# Patient Record
Sex: MALE | Race: WHITE | NOT HISPANIC OR LATINO | Employment: OTHER | URBAN - METROPOLITAN AREA
[De-identification: names, ages, dates, MRNs, and addresses within clinical notes are randomized per-mention and may not be internally consistent; named-entity substitution may affect disease eponyms.]

---

## 2024-04-03 ENCOUNTER — OFFICE VISIT (OUTPATIENT)
Dept: PRIMARY CARE | Facility: CLINIC | Age: 79
End: 2024-04-03
Payer: MEDICARE

## 2024-04-03 VITALS
SYSTOLIC BLOOD PRESSURE: 130 MMHG | TEMPERATURE: 97.8 F | HEIGHT: 69 IN | OXYGEN SATURATION: 98 % | HEART RATE: 65 BPM | WEIGHT: 169.8 LBS | BODY MASS INDEX: 25.15 KG/M2 | DIASTOLIC BLOOD PRESSURE: 68 MMHG

## 2024-04-03 DIAGNOSIS — F17.200 SMOKING: ICD-10-CM

## 2024-04-03 DIAGNOSIS — R79.9 ABNORMAL FINDING OF BLOOD CHEMISTRY, UNSPECIFIED: ICD-10-CM

## 2024-04-03 DIAGNOSIS — E88.819 INSULIN RESISTANCE: Primary | ICD-10-CM

## 2024-04-03 DIAGNOSIS — E78.5 HYPERLIPIDEMIA, UNSPECIFIED HYPERLIPIDEMIA TYPE: ICD-10-CM

## 2024-04-03 DIAGNOSIS — C61 PROSTATE CANCER (MULTI): ICD-10-CM

## 2024-04-03 DIAGNOSIS — Z13.6 ENCOUNTER FOR ABDOMINAL AORTIC ANEURYSM SCREENING: ICD-10-CM

## 2024-04-03 DIAGNOSIS — J44.9 CHRONIC OBSTRUCTIVE PULMONARY DISEASE, UNSPECIFIED COPD TYPE (MULTI): ICD-10-CM

## 2024-04-03 PROCEDURE — 90677 PCV20 VACCINE IM: CPT | Performed by: INTERNAL MEDICINE

## 2024-04-03 PROCEDURE — 1160F RVW MEDS BY RX/DR IN RCRD: CPT | Performed by: INTERNAL MEDICINE

## 2024-04-03 PROCEDURE — 1159F MED LIST DOCD IN RCRD: CPT | Performed by: INTERNAL MEDICINE

## 2024-04-03 PROCEDURE — G0009 ADMIN PNEUMOCOCCAL VACCINE: HCPCS | Performed by: INTERNAL MEDICINE

## 2024-04-03 PROCEDURE — 99205 OFFICE O/P NEW HI 60 MIN: CPT | Performed by: INTERNAL MEDICINE

## 2024-04-03 RX ORDER — ROSUVASTATIN CALCIUM 10 MG/1
10 TABLET, COATED ORAL NIGHTLY
COMMUNITY

## 2024-04-03 RX ORDER — FLUTICASONE FUROATE 100 UG/1
1 POWDER RESPIRATORY (INHALATION) DAILY
COMMUNITY

## 2024-04-03 RX ORDER — MULTIVITAMIN
1 TABLET ORAL DAILY
COMMUNITY

## 2024-04-03 RX ORDER — FLUTICASONE PROPIONATE 50 UG/1
1 POWDER, METERED RESPIRATORY (INHALATION)
COMMUNITY
Start: 2018-11-07 | End: 2024-04-03 | Stop reason: WASHOUT

## 2024-04-03 RX ORDER — ALBUTEROL SULFATE 90 UG/1
1-2 AEROSOL, METERED RESPIRATORY (INHALATION) EVERY 4 HOURS PRN
COMMUNITY
Start: 2022-11-18

## 2024-04-03 RX ORDER — FLUTICASONE PROPIONATE 50 MCG
1 SPRAY, SUSPENSION (ML) NASAL DAILY
COMMUNITY

## 2024-04-03 RX ORDER — OMEPRAZOLE 40 MG/1
40 CAPSULE, DELAYED RELEASE ORAL 2 TIMES DAILY
COMMUNITY
Start: 2024-01-10

## 2024-04-03 ASSESSMENT — ENCOUNTER SYMPTOMS
OCCASIONAL FEELINGS OF UNSTEADINESS: 0
DEPRESSION: 0
LOSS OF SENSATION IN FEET: 0

## 2024-04-03 NOTE — PROGRESS NOTES
"Chief Complaint   Patient presents with    [facility]     Coming from [facility]         History Of Present Illness:    Mateusz XIONG Hastings \"Keegan\" is a 78 y.o. male presenting to [facility] care, update health maintenance and discuss strategies for smoking cessation.  Fabricio switching his care over from [facility] to .  He does not have a history of heart problems but was meeting with a cardiologist on a regular basis.  He is interested in meeting with a cardiologist here at .  He continues to smoke cigarettes and is not sure that he is ready to quit.  He would like to stay healthy to spend more time with his granddaughter and that his motivation.  He took Chantix in the past but it caused nightmares.  He does occasionally use nicotine patch which is effective for curbing cravings.  He stays physically active and reports good exercise tolerance.  He denies chest pain or shortness of breath with exertion.  He completed a stress test on 7/12/2021 negative for ischemia.  He has a history of colon polyps and his last colonoscopy was on 1/10/2024.  5-year follow-up was recommended.    Stress test   7/12/21  CONCLUSIONS:    1. SPECT Perfusion Study: Normal.    2. There is no scintigraphic evidence for inducible ischemia.    3. No evidence of scarred myocardium.    4. Left ventricle is normal in size. The left ventricle systolic   function is normal.    5. Right ventricle is normal in size.    6. This is a low risk scan.       Active Medical Problems:  Patient Active Problem List    Diagnosis Date Noted    COPD (chronic obstructive pulmonary disease) (CMS/HCC) 04/03/2024    Insulin resistance 04/03/2024    Hyperlipidemia 04/03/2024    Smoking 04/03/2024       Past Medical History:  Past Medical History:   Diagnosis Date    Cancer (CMS/HCC) 2000    COPD (chronic obstructive pulmonary disease) (CMS/HCC)     Hyperlipidemia     Insulin resistance     Prostate cancer (CMS/[facility])     Smoking        Past Surgical History:  Past " Surgical History:   Procedure Laterality Date    EYE SURGERY  2000    MR HEAD ANGIO WO IV CONTRAST  05/30/2021    MR HEAD ANGIO WO IV CONTRAST 5/30/2021 [facility]    MR NECK ANGIO WO IV CONTRAST  05/30/2021    MR NECK ANGIO WO IV CONTRAST 5/30/2021 [facility]    NISSEN FUNDOPLICATION      PROSTATE SURGERY  2000         Social History:  Social History     Tobacco Use    Smoking status: Every Day     Packs/day: 0.50     Years: 10.00     Additional pack years: 0.00     Total pack years: 5.00     Types: Cigarettes    Smokeless tobacco: Never   Substance Use Topics    Alcohol use: Not Currently     Comment: AA for 40 years    Drug use: Never         Family History:  Family History   Problem Relation Name Age of Onset    Other (Brain tumor) Mother      Lung cancer Father          complication of surgery    Accidental death Sister Dominga     Stroke Brother Melquiades     Heart failure Daughter      No Known Problems Daughter          Pediatrician        Allergies:  Patient has no known allergies.    Outpatient Medications:    Current Outpatient Medications:     albuterol 90 mcg/actuation inhaler, Inhale 1-2 puffs every 4 hours if needed for shortness of breath., Disp: , Rfl:     cyanocobalamin (Vitamin B-12) 50 mcg tablet, Take 1 tablet (50 mcg) by mouth once daily., Disp: , Rfl:     fluticasone furoate (Arnuity Ellipta) 100 mcg/actuation inhaler, Inhale 1 puff once daily. Rinse mouth with water after use to reduce aftertaste and incidence of candidiasis. Do not swallow., Disp: , Rfl:     multivitamin tablet, Take 1 tablet by mouth once daily., Disp: , Rfl:     omeprazole (PriLOSEC) 40 mg DR capsule, Take 1 capsule (40 mg) by mouth twice a day., Disp: , Rfl:     rosuvastatin (Crestor) 10 mg tablet, Take 1 tablet (10 mg) by mouth once daily at bedtime., Disp: , Rfl:     fluticasone (Flonase) 50 mcg/actuation nasal spray, Administer 1 spray into each nostril once daily. Shake gently. Before first use, prime  "pump. After use, clean tip and replace cap., Disp: , Rfl:     Review of Systems:  Pertinent positives in review of systems outlined above.  Complete ROS otherwise negative.          Last Recorded Vitals:  Vitals:    04/03/24 1412 04/03/24 1507   BP: 146/87 130/68   BP Location: Left arm    Patient Position: Sitting    BP Cuff Size: Large adult    Pulse: 65    Temp: 36.6 °C (97.8 °F)    SpO2: 98%    Weight: 77 kg (169 lb 12.8 oz)    Height: 1.74 m (5' 8.5\")    Body mass index is 25.44 kg/m².        Physical Exam  Vitals reviewed.   Constitutional:       Appearance: Normal appearance.   HENT:      Mouth/Throat:      Pharynx: Oropharynx is clear.   Eyes:      Extraocular Movements: Extraocular movements intact.      Conjunctiva/sclera: Conjunctivae normal.      Pupils: Pupils are equal, round, and reactive to light.   Neck:      Vascular: No carotid bruit.   Cardiovascular:      Rate and Rhythm: Normal rate and regular rhythm.   Pulmonary:      Effort: Pulmonary effort is normal.      Breath sounds: Normal breath sounds.   Abdominal:      General: Abdomen is flat. Bowel sounds are normal.      Palpations: Abdomen is soft. There is no mass.      Tenderness: There is no abdominal tenderness. There is no right CVA tenderness or left CVA tenderness.   Musculoskeletal:      Cervical back: No tenderness.      Right lower leg: No edema.      Left lower leg: No edema.   Lymphadenopathy:      Cervical: No cervical adenopathy.   Neurological:      General: No focal deficit present.      Mental Status: He is alert and oriented to person, place, and time.   Psychiatric:         Mood and Affect: Mood normal.            The ASCVD Risk score (Marco CUELLAR, et al., 2019) failed to calculate for the following reasons:    The valid total cholesterol range is 130 to 320 mg/dL      Assessment/Plan   Problem List Items Addressed This Visit       COPD (chronic obstructive pulmonary disease) (CMS/[facility])     Keegan is using an Arnuity inhaler on a " regular basis.  He is using albuterol as needed.  He has completed low-dose CT scanning for lung cancer screening in the past, his scans were unremarkable.  His last scan was on 12/14/2023.  His pulmonary specialist did not recommend any additional screening.    PCV20 vaccine given at visit     We discussed the importance of smoking cessation, and he is going to begin nicotine patches.         Insulin resistance - Primary     Fasting blood sugar and A1c are due now.  We will discuss diet in detail will be talked to review labs.         Relevant Orders    Comprehensive Metabolic Panel    Hemoglobin A1C    Albumin , Urine Random    Hyperlipidemia     Fasting lipids due now.  We discussed the importance of exercising on a regular basis and eating a plant-based whole food diet to lower cholesterol and lower cardiovascular risk.  We will discuss diet in more detail at next visit.         Relevant Orders    Comprehensive Metabolic Panel    Referral to Cardiology    Smoking     We discussed the health benefits of smoking cessation including lower risk for cardiovascular events, decrease cancer risk and improved energy and physical endurance.  Keegan now believes that he is ready to quit, and he is going to start nicotine patches.    An abdominal ultrasound will be arranged to screen for aortic aneurysm.          Other Visit Diagnoses       Abnormal finding of blood chemistry, unspecified        Relevant Orders    Hemoglobin A1C    Prostate cancer (CMS/HCC)        Relevant Orders    Prostate Specific Antigen    Encounter for abdominal aortic aneurysm screening        Relevant Orders    Vascular US abdominal aorta anuerysm AAA screening            A total of 60 minutes was spent reviewing the chart and recent testing and discussing plan of care.     Fredo Zamora MD

## 2024-04-03 NOTE — PATIENT INSTRUCTIONS
Fasting blood work now     Use nicotine patches     Exercise for 30 min daily     Schedule abdominal ultrasound

## 2024-04-04 NOTE — ASSESSMENT & PLAN NOTE
Fasting lipids due now.  We discussed the importance of exercising on a regular basis and eating a plant-based whole food diet to lower cholesterol and lower cardiovascular risk.  We will discuss diet in more detail at next visit.

## 2024-04-04 NOTE — ASSESSMENT & PLAN NOTE
We discussed the health benefits of smoking cessation including lower risk for cardiovascular events, decrease cancer risk and improved energy and physical endurance.  Keegan now believes that he is ready to quit, and he is going to start nicotine patches.    An abdominal ultrasound will be arranged to screen for aortic aneurysm.

## 2024-04-04 NOTE — ASSESSMENT & PLAN NOTE
Fasting blood sugar and A1c are due now.  We will discuss diet in detail will be talked to review labs.

## 2024-04-04 NOTE — ASSESSMENT & PLAN NOTE
Keegan is using an Arnuity inhaler on a regular basis.  He is using albuterol as needed.  He has completed low-dose CT scanning for lung cancer screening in the past, his scans were unremarkable.  His last scan was on 12/14/2023.  His pulmonary specialist did not recommend any additional screening.    PCV20 vaccine given at visit     We discussed the importance of smoking cessation, and he is going to begin nicotine patches.

## 2024-04-11 ENCOUNTER — HOSPITAL ENCOUNTER (OUTPATIENT)
Dept: VASCULAR MEDICINE | Facility: HOSPITAL | Age: 79
Discharge: HOME | End: 2024-04-11
Payer: MEDICARE

## 2024-04-11 ENCOUNTER — LAB (OUTPATIENT)
Dept: LAB | Facility: LAB | Age: 79
End: 2024-04-11
Payer: MEDICARE

## 2024-04-11 DIAGNOSIS — E78.5 HYPERLIPIDEMIA, UNSPECIFIED HYPERLIPIDEMIA TYPE: ICD-10-CM

## 2024-04-11 DIAGNOSIS — R79.9 ABNORMAL FINDING OF BLOOD CHEMISTRY, UNSPECIFIED: ICD-10-CM

## 2024-04-11 DIAGNOSIS — C61 PROSTATE CANCER (MULTI): ICD-10-CM

## 2024-04-11 DIAGNOSIS — Z13.6 ENCOUNTER FOR ABDOMINAL AORTIC ANEURYSM SCREENING: ICD-10-CM

## 2024-04-11 DIAGNOSIS — E88.819 INSULIN RESISTANCE: ICD-10-CM

## 2024-04-11 LAB
ALBUMIN SERPL BCP-MCNC: 4.1 G/DL (ref 3.4–5)
ALP SERPL-CCNC: 69 U/L (ref 33–136)
ALT SERPL W P-5'-P-CCNC: 11 U/L (ref 10–52)
ANION GAP SERPL CALC-SCNC: 13 MMOL/L (ref 10–20)
AST SERPL W P-5'-P-CCNC: 13 U/L (ref 9–39)
BILIRUB SERPL-MCNC: 0.5 MG/DL (ref 0–1.2)
BUN SERPL-MCNC: 23 MG/DL (ref 6–23)
CALCIUM SERPL-MCNC: 9.4 MG/DL (ref 8.6–10.3)
CHLORIDE SERPL-SCNC: 105 MMOL/L (ref 98–107)
CO2 SERPL-SCNC: 27 MMOL/L (ref 21–32)
CREAT SERPL-MCNC: 1.04 MG/DL (ref 0.5–1.3)
CREAT UR-MCNC: 137.1 MG/DL (ref 20–370)
EGFRCR SERPLBLD CKD-EPI 2021: 74 ML/MIN/1.73M*2
EST. AVERAGE GLUCOSE BLD GHB EST-MCNC: 114 MG/DL
GLUCOSE SERPL-MCNC: 97 MG/DL (ref 74–99)
HBA1C MFR BLD: 5.6 %
MICROALBUMIN UR-MCNC: 8.1 MG/L
MICROALBUMIN/CREAT UR: 5.9 UG/MG CREAT
POTASSIUM SERPL-SCNC: 4.5 MMOL/L (ref 3.5–5.3)
PROT SERPL-MCNC: 6.1 G/DL (ref 6.4–8.2)
PSA SERPL-MCNC: <0.1 NG/ML
SODIUM SERPL-SCNC: 140 MMOL/L (ref 136–145)

## 2024-04-11 PROCEDURE — 83036 HEMOGLOBIN GLYCOSYLATED A1C: CPT

## 2024-04-11 PROCEDURE — 36415 COLL VENOUS BLD VENIPUNCTURE: CPT

## 2024-04-11 PROCEDURE — 82043 UR ALBUMIN QUANTITATIVE: CPT

## 2024-04-11 PROCEDURE — 76706 US ABDL AORTA SCREEN AAA: CPT

## 2024-04-11 PROCEDURE — 82570 ASSAY OF URINE CREATININE: CPT

## 2024-04-11 PROCEDURE — 80053 COMPREHEN METABOLIC PANEL: CPT

## 2024-04-11 PROCEDURE — 76706 US ABDL AORTA SCREEN AAA: CPT | Performed by: SURGERY

## 2024-04-11 PROCEDURE — 84153 ASSAY OF PSA TOTAL: CPT

## 2024-06-11 ENCOUNTER — APPOINTMENT (OUTPATIENT)
Dept: CARDIOLOGY | Facility: HOSPITAL | Age: 79
End: 2024-06-11
Payer: MEDICARE

## 2024-07-17 ENCOUNTER — APPOINTMENT (OUTPATIENT)
Dept: PRIMARY CARE | Facility: CLINIC | Age: 79
End: 2024-07-17
Payer: MEDICARE

## 2024-07-17 VITALS
TEMPERATURE: 98.2 F | WEIGHT: 165.4 LBS | HEART RATE: 67 BPM | SYSTOLIC BLOOD PRESSURE: 124 MMHG | BODY MASS INDEX: 24.78 KG/M2 | OXYGEN SATURATION: 97 % | RESPIRATION RATE: 18 BRPM | DIASTOLIC BLOOD PRESSURE: 64 MMHG

## 2024-07-17 DIAGNOSIS — E88.819 INSULIN RESISTANCE: ICD-10-CM

## 2024-07-17 DIAGNOSIS — G47.00 INSOMNIA, UNSPECIFIED TYPE: Primary | ICD-10-CM

## 2024-07-17 DIAGNOSIS — R79.89 LOW SERUM TOTAL PROTEIN LEVEL: ICD-10-CM

## 2024-07-17 DIAGNOSIS — E78.5 HYPERLIPIDEMIA, UNSPECIFIED HYPERLIPIDEMIA TYPE: ICD-10-CM

## 2024-07-17 PROCEDURE — 1159F MED LIST DOCD IN RCRD: CPT | Performed by: INTERNAL MEDICINE

## 2024-07-17 PROCEDURE — 99214 OFFICE O/P EST MOD 30 MIN: CPT | Performed by: INTERNAL MEDICINE

## 2024-07-17 PROCEDURE — 1160F RVW MEDS BY RX/DR IN RCRD: CPT | Performed by: INTERNAL MEDICINE

## 2024-07-17 NOTE — PROGRESS NOTES
"Chief Complaint:   Follow-up (Pt has not been sleeping well)     History Of Present Illness:    Mateusz XIONG Venkata \"Keegan\" is a 78 y.o. male with active medical problems outlined below who presents for follow up of smoking and to review recent testing    Initial visit 4/3/24  Fabricio switching his care over from [facility] to .  He does not have a history of heart problems but was meeting with a cardiologist on a regular basis.  He is interested in meeting with a cardiologist here at .  He continues to smoke cigarettes and is not sure that he is ready to quit.  He would like to stay healthy to spend more time with his granddaughter and that his motivation.  He took Chantix in the past but it caused nightmares.  He does occasionally use nicotine patch which is effective for curbing cravings.  He stays physically active and reports good exercise tolerance.  He denies chest pain or shortness of breath with exertion.  He completed a stress test on 7/12/2021 negative for ischemia.  He has a history of colon polyps and his last colonoscopy was on 1/10/2024.  5-year follow-up was recommended.    Stress test   7/12/21  CONCLUSIONS:    1. SPECT Perfusion Study: Normal.    2. There is no scintigraphic evidence for inducible ischemia.    3. No evidence of scarred myocardium.    4. Left ventricle is normal in size. The left ventricle systolic   function is normal.    5. Right ventricle is normal in size.    6. This is a low risk scan.       INTERVAL HISTORY 7/19/24  On vacation and does not smoke around family - had to come home early to care for mother in law.  She is in her 90's, other family does not help out.  Using the patch more and cutting back overall  Walking for exercise   Tennis elbow from painting and working at home   Active gardening  Taking melatonin for sleep  Hasn't slept well since time in    Having occipital headache from stress        Patient Active Problem List   Diagnosis    COPD (chronic " obstructive pulmonary disease) (Multi)    Insulin resistance    Hyperlipidemia    Smoking    Low serum total protein level    Insomnia       Current Outpatient Medications:     albuterol 90 mcg/actuation inhaler, Inhale 1-2 puffs every 4 hours if needed for shortness of breath., Disp: , Rfl:     cyanocobalamin (Vitamin B-12) 50 mcg tablet, Take 1 tablet (50 mcg) by mouth once daily., Disp: , Rfl:     fluticasone (Flonase) 50 mcg/actuation nasal spray, Administer 1 spray into each nostril once daily. Shake gently. Before first use, prime pump. After use, clean tip and replace cap., Disp: , Rfl:     fluticasone furoate (Arnuity Ellipta) 100 mcg/actuation inhaler, Inhale 1 puff once daily. Rinse mouth with water after use to reduce aftertaste and incidence of candidiasis. Do not swallow., Disp: , Rfl:     multivitamin tablet, Take 1 tablet by mouth once daily., Disp: , Rfl:     omeprazole (PriLOSEC) 40 mg DR capsule, Take 1 capsule (40 mg) by mouth twice a day., Disp: , Rfl:     rosuvastatin (Crestor) 10 mg tablet, Take 1 tablet (10 mg) by mouth once daily at bedtime., Disp: , Rfl:   Patient has no known allergies.  Social History     Tobacco Use    Smoking status: Every Day     Current packs/day: 0.50     Average packs/day: 0.5 packs/day for 10.0 years (5.0 ttl pk-yrs)     Types: Cigarettes    Smokeless tobacco: Never   Substance Use Topics    Alcohol use: Not Currently     Comment: AA for 40 years    Drug use: Never         All pertinent aspects of medical and surgical history were reviewed and updated in chart    Review of Systems   Pertinent positives in review of systems outlined above.  Complete ROS otherwise negative.        Last Recorded Vitals:  No data found.         Physical Exam  HENT:      Mouth/Throat:      Pharynx: Oropharynx is clear.   Eyes:      Extraocular Movements: Extraocular movements intact.      Conjunctiva/sclera: Conjunctivae normal.      Pupils: Pupils are equal, round, and reactive to  light.   Cardiovascular:      Rate and Rhythm: Normal rate and regular rhythm.      Pulses: Normal pulses.      Heart sounds: Normal heart sounds.   Pulmonary:      Effort: Pulmonary effort is normal.      Breath sounds: Normal breath sounds.   Musculoskeletal:      Right lower leg: No edema.      Left lower leg: No edema.             The ASCVD Risk score (Marco CUELLAR, et al., 2019) failed to calculate for the following reasons:    Cannot find a previous HDL lab    Cannot find a previous total cholesterol lab      Assessment/Plan   Problem List Items Addressed This Visit       Insulin resistance - Primary     Last FBS and A1C normal.  Will repeat with next routine labs.         Relevant Orders    Comprehensive Metabolic Panel    Hyperlipidemia     Last lipids at target.  Will include fasting lipids with next routine blood work.  Continue rosuvastatin at current dose.          Relevant Orders    Comprehensive Metabolic Panel    Lipid Panel    Low serum total protein level     Will include immunoglobulin fractions with next routine blood work.          Relevant Orders    Comprehensive Metabolic Panel    Immunoglobulins (IgG, IgA, IgM)    Insomnia     Discussed the elements of good sleep hygiene.   Emphasized the importance of establishing regular sleeping hours, developing routine to prepare for sleep and creating a bedroom environment conducive to sleep.  Referred to National Sleep Foundation web site for additional information.             A total of 30 minutes was spent reviewing the chart and recent testing and discussing plan of care.         Fredo Zamora MD

## 2024-07-22 PROBLEM — R79.89 LOW SERUM TOTAL PROTEIN LEVEL: Status: ACTIVE | Noted: 2024-07-22

## 2024-07-22 PROBLEM — G47.00 INSOMNIA: Status: ACTIVE | Noted: 2024-07-22

## 2024-07-22 NOTE — ASSESSMENT & PLAN NOTE
Discussed the elements of good sleep hygiene.   Emphasized the importance of establishing regular sleeping hours, developing routine to prepare for sleep and creating a bedroom environment conducive to sleep.  Referred to National Sleep Foundation web site for additional information.

## 2024-07-22 NOTE — ASSESSMENT & PLAN NOTE
Last lipids at target.  Will include fasting lipids with next routine blood work.  Continue rosuvastatin at current dose.

## 2024-09-04 ENCOUNTER — OFFICE VISIT (OUTPATIENT)
Dept: CARDIOLOGY | Facility: HOSPITAL | Age: 79
End: 2024-09-04
Payer: MEDICARE

## 2024-09-04 VITALS
HEART RATE: 66 BPM | RESPIRATION RATE: 18 BRPM | BODY MASS INDEX: 24.05 KG/M2 | SYSTOLIC BLOOD PRESSURE: 151 MMHG | OXYGEN SATURATION: 99 % | WEIGHT: 168 LBS | DIASTOLIC BLOOD PRESSURE: 75 MMHG | HEIGHT: 70 IN

## 2024-09-04 DIAGNOSIS — F17.200 SMOKING: ICD-10-CM

## 2024-09-04 DIAGNOSIS — I25.10 CORONARY ARTERIOSCLEROSIS: Primary | ICD-10-CM

## 2024-09-04 DIAGNOSIS — E78.5 HYPERLIPIDEMIA, UNSPECIFIED HYPERLIPIDEMIA TYPE: ICD-10-CM

## 2024-09-04 LAB
ATRIAL RATE: 67 BPM
P AXIS: 74 DEGREES
P OFFSET: 212 MS
P ONSET: 153 MS
PR INTERVAL: 148 MS
Q ONSET: 227 MS
QRS COUNT: 11 BEATS
QRS DURATION: 86 MS
QT INTERVAL: 418 MS
QTC CALCULATION(BAZETT): 441 MS
QTC FREDERICIA: 434 MS
R AXIS: 2 DEGREES
T AXIS: 53 DEGREES
T OFFSET: 436 MS
VENTRICULAR RATE: 67 BPM

## 2024-09-04 PROCEDURE — 99406 BEHAV CHNG SMOKING 3-10 MIN: CPT | Performed by: INTERNAL MEDICINE

## 2024-09-04 PROCEDURE — G2211 COMPLEX E/M VISIT ADD ON: HCPCS | Performed by: INTERNAL MEDICINE

## 2024-09-04 PROCEDURE — 99204 OFFICE O/P NEW MOD 45 MIN: CPT | Performed by: INTERNAL MEDICINE

## 2024-09-04 PROCEDURE — 1160F RVW MEDS BY RX/DR IN RCRD: CPT | Performed by: INTERNAL MEDICINE

## 2024-09-04 PROCEDURE — 99214 OFFICE O/P EST MOD 30 MIN: CPT | Performed by: INTERNAL MEDICINE

## 2024-09-04 PROCEDURE — 93005 ELECTROCARDIOGRAM TRACING: CPT | Performed by: INTERNAL MEDICINE

## 2024-09-04 PROCEDURE — 1159F MED LIST DOCD IN RCRD: CPT | Performed by: INTERNAL MEDICINE

## 2024-09-04 ASSESSMENT — ENCOUNTER SYMPTOMS: DEPRESSION: 0

## 2024-09-04 NOTE — PROGRESS NOTES
Primary Care Physician: Donny Larose MD  Date of Visit: 2024 10:20 AM EDT  Location of visit: [facility]     Chief Complaint:   Chief Complaint   Patient presents with    New Patient Visit    Hyperlipidemia        HPI / Summary:   Mateusz Hastings is a 78 y.o. male presents to establish cardiovascular care.  He is a pleasant 78-year-old white male with a past medical history significant for coronary atherosclerosis on CT, hyperlipidemia, chronic tobacco use, and prostate cancer.  He has no cardiac complaints.  He is physically active and walks regularly including golfing and going up and down stairs without chest pain or shortness of breath.  He also swims regularly without symptoms.  The patient denies chest pain, shortness of breath, palpitations, lightheadedness, syncope, orthopnea, paroxysmal nocturnal dyspnea, lower extremity edema, or bleeding problems.    The patient has no prior history of myocardial infarction, cerebrovascular disease, venous thromboembolic disease, diabetes, hypertension, peripheral arterial disease/claudication, or bleeding.    He has a history of hyperlipidemia and has been on low-dose rosuvastatin for approximately 1.5 years without any side effects.    He has a history of prostate cancer and is status post prostatectomy.    He has a history of a hiatal hernia repair and bilateral Achilles tendon repair.    He has no known drug allergies.    He smokes half a pack of cigarettes daily for many years.  He denies any alcohol or illicit drug use.  He quit drinking alcohol approximately 40 years ago and attends .  He is a  and is retired.  He is  but has a significant other for more than last 10 years.  He lives at home alone.    His brother  suddenly at the age of 62.  No known family history of premature coronary disease.          Past Medical History:   Diagnosis Date    Cancer (Multi)     COPD (chronic obstructive pulmonary  "disease) (Multi)     Hyperlipidemia     Insulin resistance     Prostate cancer (Multi)     Smoking         Past Surgical History:   Procedure Laterality Date    EYE SURGERY  2000    MR HEAD ANGIO WO IV CONTRAST  05/30/2021    MR HEAD ANGIO WO IV CONTRAST 5/30/2021 [facility]    MR NECK ANGIO WO IV CONTRAST  05/30/2021    MR NECK ANGIO WO IV CONTRAST 5/30/2021 [facility]    NISSEN FUNDOPLICATION      PROSTATE SURGERY  2000          Social History:   reports that he has been smoking cigarettes. He has a 5 pack-year smoking history. He has never used smokeless tobacco. He reports that he does not currently use alcohol. He reports that he does not use drugs.     Family History:  family history includes Accidental death in his sister; Brain tumor in his mother; Heart failure in his daughter; Lung cancer in his father; No Known Problems in his daughter; Stroke in his brother.      Allergies:  No Known Allergies    Outpatient Medications:  Current Outpatient Medications   Medication Instructions    albuterol 90 mcg/actuation inhaler 1-2 puffs, inhalation, Every 4 hours PRN    cyanocobalamin (Vitamin B-12) 50 mcg tablet 1 tablet, oral, Daily    fluticasone (Flonase) 50 mcg/actuation nasal spray 1 spray, Each Nostril, Daily, Shake gently. Before first use, prime pump. After use, clean tip and replace cap.    fluticasone furoate (Arnuity Ellipta) 100 mcg/actuation inhaler 1 puff, inhalation, Daily, Rinse mouth with water after use to reduce aftertaste and incidence of candidiasis. Do not swallow.    multivitamin tablet 1 tablet, oral, Daily    omeprazole (PRILOSEC) 40 mg, oral, 2 times daily    rosuvastatin (CRESTOR) 10 mg, oral, Nightly       Physical Exam:  Vitals:    09/04/24 1039 09/04/24 1041   BP: 154/89 151/75   BP Location: Right arm Left arm   Patient Position: Sitting Sitting   BP Cuff Size: Adult Adult   Pulse: 66    Resp: 18    SpO2: 99%    Weight: 76.2 kg (168 lb)    Height: 1.778 m (5' 10\")  "     Wt Readings from Last 5 Encounters:   09/04/24 76.2 kg (168 lb)   07/17/24 75 kg (165 lb 6.4 oz)   04/03/24 77 kg (169 lb 12.8 oz)     Body mass index is 24.11 kg/m².   Repeat blood pressure 126/68  General: Well-developed well-nourished in no acute distress  HEENT: Normocephalic atraumatic  Neck: Supple, JVP is normal negative hepatojugular reflux 2+ carotid pulses without bruit  Pulmonary: Normal respiratory effort, clear to auscultation  Cardiovascular: No right ventricular heave, normal S1 and S2, no murmurs rubs or gallops  Abdomen: Soft nontender nondistended  Extremities: Warm without edema 2+ radial pulses bilaterally 2+ dorsalis pedis pulses bilaterally  Neurologic: Alert and oriented x3  Psychiatric: Normal mood and affect     Last Labs:  CMP:  Recent Labs     04/11/24  0808 05/30/21 1910    140   K 4.5 3.9    106   CO2 27 27   ANIONGAP 13 11   BUN 23 18   CREATININE 1.04 1.13   EGFR 74  --    GLUCOSE 97 112*     Recent Labs     04/11/24  0808 05/30/21 1910   ALBUMIN 4.1 4.0   ALKPHOS 69 65   ALT 11 11   AST 13 11   BILITOT 0.5 0.3     CBC:  Recent Labs     05/30/21 1910   WBC 10.4   HGB 14.3   HCT 42.9      MCV 93     COAG:   Recent Labs     05/30/21 1910   INR 1.1     HEME/ENDO:  Recent Labs     04/11/24  0808 07/19/23  0838 09/19/22  1527   HGBA1C 5.6 5.6 5.7*      CARDIAC:   Recent Labs     05/31/21  0631   BNP 52     Recent Labs     05/31/21  0629   CHOL 113   LDLF 66   HDL 34.1*   TRIG 66     I reviewed laboratory data from July 19, 2023.  Total cholesterol 145 triglycerides 63 HDL 45 LDL 87 hemoglobin 15.4 hematocrit 45.3 platelet count 235    Last Cardiology Tests:  ECG:  An electrocardiogram performed today that I reviewed shows sinus rhythm with PACs.    Echo:  Echo Results:  No results found for this or any previous visit from the past 3650 days.       Cath:      Stress Test:  Lexiscan nuclear stress test July 2021  CONCLUSIONS:    1. SPECT Perfusion Study: Normal.     2. There is no scintigraphic evidence for inducible ischemia.    3. No evidence of scarred myocardium.    4. Left ventricle is normal in size. The left ventricle systolic   function is normal.    5. Right ventricle is normal in size.    6. This is a low risk scan.             Gated Stress FBP    LVEF % 74          Cardiac Imaging:  Abdominal aortic ultrasound April 11, 2024  Aorta/Common Iliac Arteries/IVC: The limited exam of the distal aorta demonstrates no evidence of an abdominal aortic aneurysm.     CT chest lung screening December 15, 2023  The thoracic aorta is normal in caliber.  The arch vessel branching   pattern is bovine (i.e., common trunk of the innominate and left common   carotid artery).  Central pulmonary arteries are normal in size.  There   is mild calcification of the aortic valve leaflets.  Cardiac chambers are   normal in size.  No pericardial effusion.  Mild to moderate degenerative   changes are noted in the thoracic spine.  T6 vertebral body hemangioma is   noted.     Emphysema: Trivial, paraseptal, upper lobe   Coronary Artery Calcifications: Circumflex  Mild; Left Anterior   Descending Moderate; Right Coronary Minimum       Assessment/Plan   Diagnoses and all orders for this visit:  Coronary arteriosclerosis  -     ECG 12 lead (Clinic Performed)  Hyperlipidemia, unspecified hyperlipidemia type  -     ECG 12 lead (Clinic Performed)  -     Referral to Cardiology  -     Lipid panel; Future  Smoking    In summary Mr. Hastings is a pleasant 78-year-old white male with a past medical history significant for coronary atherosclerosis on CT, hyperlipidemia, chronic tobacco use, and prostate cancer.  He is asymptomatic from a cardiac perspective.  I did educate him with regards to the importance of risk factor modification and signs and symptoms of acute coronary syndromes.  I strongly encouraged him to stop smoking and spent more than 3 minutes of time counseling him to do so.  I ordered a fasting  lipid profile.  He will likely benefit from a higher statin dose.  His initial blood pressure was elevated but his repeat was acceptable.  He should continue his current cardiovascular medications.  I will see him back in follow-up in 1 year.      Orders:  Orders Placed This Encounter   Procedures    Lipid panel    ECG 12 lead (Clinic Performed)      Followup Appts:  Future Appointments   Date Time Provider Department Center   1/17/2025 10:00 AM Fredo Zamora MD [id number] [facility]           ____________________________________________________________  Sunny Martinez MD  [facility] [place]  [facility]

## 2024-09-13 ENCOUNTER — LAB (OUTPATIENT)
Dept: LAB | Facility: LAB | Age: 79
End: 2024-09-13
Payer: MEDICARE

## 2024-09-13 DIAGNOSIS — E78.5 HYPERLIPIDEMIA, UNSPECIFIED HYPERLIPIDEMIA TYPE: ICD-10-CM

## 2024-09-13 LAB
CHOLEST SERPL-MCNC: 118 MG/DL (ref 0–199)
CHOLESTEROL/HDL RATIO: 2.7
HDLC SERPL-MCNC: 43.9 MG/DL
LDLC SERPL CALC-MCNC: 61 MG/DL
NON HDL CHOLESTEROL: 74 MG/DL (ref 0–149)
TRIGL SERPL-MCNC: 67 MG/DL (ref 0–149)
VLDL: 13 MG/DL (ref 0–40)

## 2024-09-13 PROCEDURE — 80061 LIPID PANEL: CPT

## 2024-09-13 PROCEDURE — 36415 COLL VENOUS BLD VENIPUNCTURE: CPT

## 2024-09-19 DIAGNOSIS — E78.5 HYPERLIPIDEMIA, UNSPECIFIED HYPERLIPIDEMIA TYPE: Primary | ICD-10-CM

## 2024-09-19 NOTE — TELEPHONE ENCOUNTER
Pt notified to increase Rosuvastatin to 20 mg one tablet nightly PO related to mpost recent lipid panel results.  Pt encouraged to reach out to office for any questions or concerns.

## 2024-09-20 RX ORDER — ROSUVASTATIN CALCIUM 20 MG/1
20 TABLET, COATED ORAL DAILY
Qty: 90 TABLET | Refills: 3 | Status: SHIPPED | OUTPATIENT
Start: 2024-09-20 | End: 2025-09-20

## 2024-09-28 DIAGNOSIS — J44.1 COPD EXACERBATION (MULTI): Primary | ICD-10-CM

## 2024-09-28 RX ORDER — AZITHROMYCIN 250 MG/1
TABLET, FILM COATED ORAL
Qty: 6 TABLET | Refills: 0 | Status: SHIPPED | OUTPATIENT
Start: 2024-09-28 | End: 2024-10-03

## 2024-09-28 RX ORDER — ALBUTEROL SULFATE 90 UG/1
1-2 INHALANT RESPIRATORY (INHALATION) EVERY 4 HOURS PRN
Qty: 18 G | Refills: 3 | Status: SHIPPED | OUTPATIENT
Start: 2024-09-28

## 2024-09-28 RX ORDER — METHYLPREDNISOLONE 4 MG/1
TABLET ORAL
Qty: 21 TABLET | Refills: 0 | Status: SHIPPED | OUTPATIENT
Start: 2024-09-28 | End: 2024-10-05

## 2024-09-28 NOTE — PROGRESS NOTES
"Woke up last night \"hacking like crazy\".  Having symptoms for 7 days - mostly cough, not able to clear phlegm from chest.  Mild SOB.  Using arnuity inhaler but not albuterol.   Able to speak in full sentences, not labored.  I ordered medrol, zithromax and refilled albuterol.  To call if not responding to treatment.    "

## 2024-10-01 ENCOUNTER — TELEPHONE (OUTPATIENT)
Dept: PRIMARY CARE | Facility: CLINIC | Age: 79
End: 2024-10-01
Payer: MEDICARE

## 2024-11-01 ENCOUNTER — OFFICE VISIT (OUTPATIENT)
Dept: PRIMARY CARE | Facility: CLINIC | Age: 79
End: 2024-11-01
Payer: MEDICARE

## 2024-11-01 VITALS
WEIGHT: 162.8 LBS | SYSTOLIC BLOOD PRESSURE: 120 MMHG | BODY MASS INDEX: 23.36 KG/M2 | OXYGEN SATURATION: 97 % | HEART RATE: 63 BPM | TEMPERATURE: 97.9 F | DIASTOLIC BLOOD PRESSURE: 60 MMHG

## 2024-11-01 DIAGNOSIS — H93.8X1 MASS OF RIGHT EAR CANAL: ICD-10-CM

## 2024-11-01 DIAGNOSIS — R09.81 SINUS CONGESTION: Primary | ICD-10-CM

## 2024-11-01 PROCEDURE — 1159F MED LIST DOCD IN RCRD: CPT | Performed by: INTERNAL MEDICINE

## 2024-11-01 PROCEDURE — 99213 OFFICE O/P EST LOW 20 MIN: CPT | Performed by: INTERNAL MEDICINE

## 2024-11-01 PROCEDURE — 1160F RVW MEDS BY RX/DR IN RCRD: CPT | Performed by: INTERNAL MEDICINE

## 2024-11-01 RX ORDER — AZELASTINE 1 MG/ML
1 SPRAY, METERED NASAL 2 TIMES DAILY
Qty: 30 ML | Refills: 12 | Status: SHIPPED | OUTPATIENT
Start: 2024-11-01 | End: 2025-11-01

## 2024-11-02 PROBLEM — R09.81 SINUS CONGESTION: Status: ACTIVE | Noted: 2024-11-02

## 2024-11-02 PROBLEM — H93.8X1 MASS OF RIGHT EAR CANAL: Status: ACTIVE | Noted: 2024-11-02

## 2024-11-11 ENCOUNTER — TELEPHONE (OUTPATIENT)
Dept: PRIMARY CARE | Facility: CLINIC | Age: 79
End: 2024-11-11

## 2024-11-11 DIAGNOSIS — R63.4 UNEXPLAINED WEIGHT LOSS: ICD-10-CM

## 2024-11-11 DIAGNOSIS — R91.1 PULMONARY NODULE: Primary | ICD-10-CM

## 2024-11-11 NOTE — TELEPHONE ENCOUNTER
I placed order for CT scan of lungs (we discussed this at his last visit).  He is due for blood work and should have it done tomorrow morning if possible.  He needs a current assessment of kidney function prior to having the CT scan done.      What are his current symptoms?  Just cough or is he out of breath, having chest pain or fevers?    Thanks,

## 2024-11-14 ENCOUNTER — LAB (OUTPATIENT)
Dept: LAB | Facility: LAB | Age: 79
End: 2024-11-14
Payer: MEDICARE

## 2024-11-14 DIAGNOSIS — R79.89 LOW SERUM TOTAL PROTEIN LEVEL: ICD-10-CM

## 2024-11-14 DIAGNOSIS — D80.1 HYPOGAMMAGLOBULINEMIA (MULTI): ICD-10-CM

## 2024-11-14 DIAGNOSIS — E78.5 HYPERLIPIDEMIA, UNSPECIFIED HYPERLIPIDEMIA TYPE: ICD-10-CM

## 2024-11-14 DIAGNOSIS — E88.819 INSULIN RESISTANCE: ICD-10-CM

## 2024-11-14 LAB
ALBUMIN SERPL BCP-MCNC: 4 G/DL (ref 3.4–5)
ALP SERPL-CCNC: 70 U/L (ref 33–136)
ALT SERPL W P-5'-P-CCNC: 15 U/L (ref 10–52)
ANION GAP SERPL CALC-SCNC: 14 MMOL/L (ref 10–20)
AST SERPL W P-5'-P-CCNC: 14 U/L (ref 9–39)
BILIRUB SERPL-MCNC: 0.6 MG/DL (ref 0–1.2)
BUN SERPL-MCNC: 17 MG/DL (ref 6–23)
CALCIUM SERPL-MCNC: 9.3 MG/DL (ref 8.6–10.6)
CHLORIDE SERPL-SCNC: 106 MMOL/L (ref 98–107)
CHOLEST SERPL-MCNC: 126 MG/DL (ref 0–199)
CHOLESTEROL/HDL RATIO: 2.5
CO2 SERPL-SCNC: 30 MMOL/L (ref 21–32)
CREAT SERPL-MCNC: 0.9 MG/DL (ref 0.5–1.3)
EGFRCR SERPLBLD CKD-EPI 2021: 88 ML/MIN/1.73M*2
GLUCOSE SERPL-MCNC: 93 MG/DL (ref 74–99)
HDLC SERPL-MCNC: 51 MG/DL
IGA SERPL-MCNC: 119 MG/DL (ref 70–400)
IGG SERPL-MCNC: 600 MG/DL (ref 700–1600)
IGM SERPL-MCNC: 32 MG/DL (ref 40–230)
LDLC SERPL CALC-MCNC: 61 MG/DL
NON HDL CHOLESTEROL: 75 MG/DL (ref 0–149)
POTASSIUM SERPL-SCNC: 4.5 MMOL/L (ref 3.5–5.3)
PROT SERPL-MCNC: 6 G/DL (ref 6.4–8.2)
SODIUM SERPL-SCNC: 145 MMOL/L (ref 136–145)
TRIGL SERPL-MCNC: 72 MG/DL (ref 0–149)
VLDL: 14 MG/DL (ref 0–40)

## 2024-11-14 PROCEDURE — 84165 PROTEIN E-PHORESIS SERUM: CPT

## 2024-11-14 PROCEDURE — 82784 ASSAY IGA/IGD/IGG/IGM EACH: CPT

## 2024-11-14 PROCEDURE — 80053 COMPREHEN METABOLIC PANEL: CPT

## 2024-11-14 PROCEDURE — 36415 COLL VENOUS BLD VENIPUNCTURE: CPT

## 2024-11-14 PROCEDURE — 80061 LIPID PANEL: CPT

## 2024-11-16 DIAGNOSIS — D80.1 HYPOGAMMAGLOBULINEMIA (MULTI): Primary | ICD-10-CM

## 2024-11-16 LAB — PROT SERPL-MCNC: 6.1 G/DL (ref 6.4–8.2)

## 2024-11-18 LAB
ALBUMIN: 3.7 G/DL (ref 3.4–5)
ALPHA 1 GLOBULIN: 0.4 G/DL (ref 0.2–0.6)
ALPHA 2 GLOBULIN: 0.8 G/DL (ref 0.4–1.1)
BETA GLOBULIN: 0.7 G/DL (ref 0.5–1.2)
GAMMA GLOBULIN: 0.6 G/DL (ref 0.5–1.4)
PATH REVIEW-SERUM PROTEIN ELECTROPHORESIS: NORMAL
PROTEIN ELECTROPHORESIS COMMENT: NORMAL

## 2024-11-21 ENCOUNTER — APPOINTMENT (OUTPATIENT)
Dept: RADIOLOGY | Facility: CLINIC | Age: 79
End: 2024-11-21
Payer: MEDICARE

## 2024-11-21 ENCOUNTER — HOSPITAL ENCOUNTER (OUTPATIENT)
Dept: RADIOLOGY | Facility: CLINIC | Age: 79
End: 2024-11-21
Payer: MEDICARE

## 2024-11-21 ENCOUNTER — HOSPITAL ENCOUNTER (OUTPATIENT)
Dept: RADIOLOGY | Facility: HOSPITAL | Age: 79
Discharge: HOME | End: 2024-11-21
Payer: MEDICARE

## 2024-11-21 DIAGNOSIS — R91.1 PULMONARY NODULE: ICD-10-CM

## 2024-11-21 DIAGNOSIS — R63.4 UNEXPLAINED WEIGHT LOSS: ICD-10-CM

## 2024-11-21 PROCEDURE — 2550000001 HC RX 255 CONTRASTS: Performed by: INTERNAL MEDICINE

## 2024-11-21 PROCEDURE — 71260 CT THORAX DX C+: CPT | Performed by: RADIOLOGY

## 2024-11-21 PROCEDURE — 71260 CT THORAX DX C+: CPT

## 2024-11-22 DIAGNOSIS — D80.1 HYPOGAMMAGLOBULINEMIA (MULTI): Primary | ICD-10-CM

## 2024-11-25 ENCOUNTER — HOSPITAL ENCOUNTER (OUTPATIENT)
Dept: RADIOLOGY | Facility: EXTERNAL LOCATION | Age: 79
Discharge: HOME | End: 2024-11-25
Payer: MEDICARE

## 2024-11-26 ENCOUNTER — TELEPHONE (OUTPATIENT)
Dept: PRIMARY CARE | Facility: CLINIC | Age: 79
End: 2024-11-26

## 2024-11-26 NOTE — TELEPHONE ENCOUNTER
PATIENT CALL STATED HE STILL DOESN'T FEEL GOOD AND STUFFY PATIENT STATED HE STILL HAVE A LOT OF MUCUS PLEASE GIVE PATIENT A CALL BACK [phone]

## 2024-11-27 ASSESSMENT — ENCOUNTER SYMPTOMS
SWEATS: 1
COUGH: 1
RHINORRHEA: 1

## 2024-11-29 NOTE — PROGRESS NOTES
"        Reason for Consult:  New Patient Visit (Lesion in external canal.)     Subjective   History Of Present Illness:  Mateusz Hastings \"Keegan\" is a 79 y.o. male who recently went to PCP who reported a mass in the right ear canal and recommended ENT referral. Patient denied history of tinnitus, dizziness, aural fullness, or excessive noise exposure. No history of hearing aid use.    He was referred to me for evaluation and treatment.     Past Medical History:  He has a past medical history of Cancer (Multi) (2000), COPD (chronic obstructive pulmonary disease) (Multi), Hyperlipidemia, Insulin resistance, Prostate cancer (Multi), and Smoking.    Surgical History:  He has a past surgical history that includes MR angio head wo IV contrast (05/30/2021); MR angio neck wo IV contrast (05/30/2021); Eye surgery (2000); Prostate surgery (2000); and Nissen fundoplication.     Social History:  He reports that he has been smoking cigarettes. He has a 5 pack-year smoking history. He has never used smokeless tobacco. He reports that he does not currently use alcohol. He reports that he does not use drugs.    Family History:  family history includes Accidental death in his sister; Brain tumor in his mother; Heart failure in his daughter; Lung cancer in his father; No Known Problems in his daughter; Stroke in his brother.     Medications:  Current Outpatient Medications   Medication Instructions    albuterol 90 mcg/actuation inhaler 1-2 puffs, inhalation, Every 4 hours PRN    azelastine (Astelin) 137 mcg (0.1 %) nasal spray 1 spray, Each Nostril, 2 times daily, Use in each nostril as directed    cyanocobalamin (Vitamin B-12) 50 mcg tablet 1 tablet, Daily    fluticasone (Flonase) 50 mcg/actuation nasal spray 1 spray, Daily    fluticasone furoate (Arnuity Ellipta) 100 mcg/actuation inhaler 1 puff, Daily    multivitamin tablet 1 tablet, Daily    omeprazole (PRILOSEC) 40 mg, 2 times daily    predniSONE (DELTASONE) 10 mg    rosuvastatin " "(CRESTOR) 20 mg, oral, Daily      Allergies:  Patient has no known allergies.    Review of Systems:   A comprehensive 10-point review of systems was obtained including constitutional, neurological, HEENT, pulmonary, cardiovascular, genito-urinary, and other pertinent systems and was negative except as noted in the HPI.     Objective   Physical Exam:  Last Recorded Vitals: Height 1.778 m (5' 10\"), weight 74.4 kg (164 lb).    On physical exam, the patient is a well-nourished, well-developed patient, in no acute distress, able to communicate without assistance in English language. Head and face is atraumatic and normocephalic. Salivary glands are intact. Facial strength is symmetrical bilaterally.       On ear examination:  Right ear: The patient has an open and patent ear canal with a small ear canal osteoma, not collecting debris and not obstructing the ear canal. The tympanic membrane is intact.  AC>BC  Left ear: The patient has an open and patent ear canal. The tympanic membrane is intact.  AC>BC  The Gotti is midline    On vestibular exam, the patient has no spontaneous nystagmus, no headshake nystagmus, no head-thrust nystagmus, and no nystagmus on hyperventilation or Valsalva maneuvers. [place]-Hallpike maneuver is negative bilaterally.       On neuro exam, the patient is alert and oriented x3, cranial nerves are grossly intact, cerebellar exam is normal.      The rest of the exam, including anterior rhinoscopy, oropharyngeal exam, neck exam, and cardiovascular exam, were normal including no palpable lymphadenopathies, thyroid in the midline position, normal pulses, and normal chest excursion.       Reviewed Results:  Audiology Testing:   I personally reviewed the audiogram from 12/2024 that showed:   Right ear: Normal downsloping to a moderately severe sensorineural hearing loss. Discrimination: 90%   Left ear: Normal downsloping to a moderately severe sensorineural hearing loss. Discrimination: " "100%      Imaging:  None     Procedure:  None    Assessment/Plan     1. Mass of right ear canal    2. Osteoma of ear canal    3. Sensorineural hearing loss (SNHL) of both ears        In summary, Mateusz Hastings \"Keegan\" is a 79 y.o. male with baseline bilateral sensorineural hearing loss in the moderately severe range, with good speech understanding.  Additionally he has a right ear canal osteoma that is not causing problems, and is not obstructing the EAC.    For the osteoma there is no need for treatment, as it is currently asymptomatic.    For his baseline bilateral sensory hearing loss he is a very good candidate for bilateral hearing aids.  I provided him with an order for hearing aid evaluation.    I will see him back as needed.  He needs to continue checking his hearing every 2 to 3 years to make sure there is no further decline.         ____________________________________________________  Rusty Charles MD  Professor and Chief   Otology/Neurotology/Lateral Skull-Base Surgery   [facility]  "

## 2024-12-02 ENCOUNTER — CLINICAL SUPPORT (OUTPATIENT)
Dept: AUDIOLOGY | Facility: CLINIC | Age: 79
End: 2024-12-02
Payer: MEDICARE

## 2024-12-02 ENCOUNTER — APPOINTMENT (OUTPATIENT)
Dept: OTOLARYNGOLOGY | Facility: CLINIC | Age: 79
End: 2024-12-02
Payer: MEDICARE

## 2024-12-02 VITALS — BODY MASS INDEX: 23.48 KG/M2 | WEIGHT: 164 LBS | HEIGHT: 70 IN

## 2024-12-02 DIAGNOSIS — H90.3 SENSORINEURAL HEARING LOSS (SNHL) OF BOTH EARS: ICD-10-CM

## 2024-12-02 DIAGNOSIS — H93.8X1 MASS OF RIGHT EAR CANAL: Primary | ICD-10-CM

## 2024-12-02 DIAGNOSIS — H90.3 SENSORINEURAL HEARING LOSS (SNHL) OF BOTH EARS: Primary | ICD-10-CM

## 2024-12-02 DIAGNOSIS — D16.4 OSTEOMA OF EAR CANAL: ICD-10-CM

## 2024-12-02 PROCEDURE — 99204 OFFICE O/P NEW MOD 45 MIN: CPT | Performed by: OTOLARYNGOLOGY

## 2024-12-02 PROCEDURE — 92557 COMPREHENSIVE HEARING TEST: CPT

## 2024-12-02 PROCEDURE — 1160F RVW MEDS BY RX/DR IN RCRD: CPT | Performed by: OTOLARYNGOLOGY

## 2024-12-02 PROCEDURE — 1159F MED LIST DOCD IN RCRD: CPT | Performed by: OTOLARYNGOLOGY

## 2024-12-02 RX ORDER — PREDNISONE 10 MG/1
10 TABLET ORAL
COMMUNITY
Start: 2024-11-27

## 2024-12-02 ASSESSMENT — PAIN - FUNCTIONAL ASSESSMENT: PAIN_FUNCTIONAL_ASSESSMENT: 0-10

## 2024-12-02 ASSESSMENT — PATIENT HEALTH QUESTIONNAIRE - PHQ9
1. LITTLE INTEREST OR PLEASURE IN DOING THINGS: NOT AT ALL
2. FEELING DOWN, DEPRESSED OR HOPELESS: NOT AT ALL
SUM OF ALL RESPONSES TO PHQ9 QUESTIONS 1 AND 2: 0

## 2024-12-02 ASSESSMENT — PAIN SCALES - GENERAL: PAINLEVEL_OUTOF10: 0 - NO PAIN

## 2024-12-02 NOTE — PROGRESS NOTES
"    ADULT AUDIOMETRIC EVALUATION      Name:  Mateusz Hastings \"Keegan\"  :  1945  Age:  79 y.o.  Date of Evaluation:  2024    IMPRESSIONS     Today's test results are consistent with normal hearing sensitivity sloping to a moderately-severe SNHL, bilaterally. Discussed results and recommendations with patient.  Questions were addressed and the patient was encouraged to contact our department should concerns arise.    RECOMMENDATIONS     Continue medical follow up with PCP/ENT.  Return for evaluation following any medical management.     Time: 7813-5959    HISTORY     Mateusz Hastings is seen today in conjunction with ENT appointment. Patient reported no concerns for hearing. Recently went to PCP who reported a mass in the right ear canal and recommended ENT referral. Patient denied history of tinnitus, dizziness, aural fullness, or excessive noise exposure. No history of hearing aid use.    TEST RESULTS     Screening Measures: Risk screenings are completed annually or more frequently as designated upon arrival to an outpatient location    Steadi Fall Risk: One or more falls in the last year? No  Domestic Violence: Do you feel UNSAFE going back to the place you are living? No/Not Indicated  Pain: Are you in any pain (0-10)? 0    Otoscopic Evaluation:  Physical exam to evaluate the outer ear  Right Ear: Clear ear canal. Visualized mass in canal.  Left Ear: Clear ear canal.    Tympanometry & Acoustic Reflexes:  Assesses the function of the middle ear and inner ear structures  Right Ear:  Could not create hermetic seal.    Left Ear: Could not create hermetic seal.     Distortion Product Otoacoustic Emissions: Assesses the cochlear outer hair cell function  Right Ear: DNT  Left Ear:  DNT    Pure Tone Audiometry: Conventional Audiometry via TDH Headphones with good reliability  Right Ear: Hearing sensitivity WNL from 125-1000 Hz sloping to a moderately-severe SNHL.  Left Ear: Hearing sensitivity WNL from 125-1000 " Hz sloping to a moderately-severe SNHL.    Speech Audiometry:   Right Ear: Speech Reception Threshold (SRT) was obtained at 25 dBHL. Speech reception threshold in agreement with pure tone average. Word Recognition scores were excellent (90%) in quiet when words were presented at 80 dBHL.   Left Ear: Speech Reception Threshold (SRT) was obtained at 25 dBHL. Speech reception threshold in agreement with pure tone average. Word Recognition scores were excellent (100%) in quiet when words were presented at 80 dBHL.       Testing and interpretation of results completed by Shefali Dixon CCC-A. It was my pleasure to evaluate this patient.       Shefali Dixon, CCC-A  Senior Clinical Vestibular Audiologist    Degree of Hearing Sensitivity Decibel Range   Within Normal Limits (WNL) 0-25   Mild 26-40   Moderate 41-55   Moderately-Severe 56-70   Severe 71-90   Profound 91+      Key   CNT/DNT Could Not Test/Did Not Test   TM Tympanic Membrane   WNL Within Normal Limits   HA Hearing Aid   SNHL Sensorineural Hearing Loss   CHL Conductive Hearing Loss   NIHL Noise-Induced Hearing Loss   ECV Ear Canal Volume   RE/LE Right Ear/Left Ear        AUDIOGRAM

## 2024-12-02 NOTE — LETTER
"December 17, 2024     Fredo Zamora MD  [address] [place] [address] Dr Martha Perrin [place], [facility] [address]  [place] [id number]    Patient: Keegan Hastings   YOB: 1945   Date of Visit: 12/2/2024       Dear Dr. Fredo Zamora MD:    Thank you for referring Keegan Hastings to me for evaluation. Below are my notes for this consultation.  If you have questions, please do not hesitate to call me. I look forward to following your patient along with you.       Sincerely,     Rusty Barnard MD      CC: No Recipients  ______________________________________________________________________________________            Reason for Consult:  New Patient Visit (Lesion in external canal.)     Subjective  History Of Present Illness:  Mateusz Hastings \"Per" is a 79 y.o. male who recently went to PCP who reported a mass in the right ear canal and recommended ENT referral. Patient denied history of tinnitus, dizziness, aural fullness, or excessive noise exposure. No history of hearing aid use.    He was referred to me for evaluation and treatment.     Past Medical History:  He has a past medical history of Cancer (Multi) (2000), COPD (chronic obstructive pulmonary disease) (Multi), Hyperlipidemia, Insulin resistance, Prostate cancer (Multi), and Smoking.    Surgical History:  He has a past surgical history that includes MR angio head wo IV contrast (05/30/2021); MR angio neck wo IV contrast (05/30/2021); Eye surgery (2000); Prostate surgery (2000); and Nissen fundoplication.     Social History:  He reports that he has been smoking cigarettes. He has a 5 pack-year smoking history. He has never used smokeless tobacco. He reports that he does not currently use alcohol. He reports that he does not use drugs.    Family History:  family history includes Accidental death in his sister; Brain tumor in his mother; Heart failure in his daughter; Lung cancer in his father; No Known Problems in his daughter; Stroke in his brother.     Medications:  Current " "Outpatient Medications   Medication Instructions   • albuterol 90 mcg/actuation inhaler 1-2 puffs, inhalation, Every 4 hours PRN   • azelastine (Astelin) 137 mcg (0.1 %) nasal spray 1 spray, Each Nostril, 2 times daily, Use in each nostril as directed   • cyanocobalamin (Vitamin B-12) 50 mcg tablet 1 tablet, Daily   • fluticasone (Flonase) 50 mcg/actuation nasal spray 1 spray, Daily   • fluticasone furoate (Arnuity Ellipta) 100 mcg/actuation inhaler 1 puff, Daily   • multivitamin tablet 1 tablet, Daily   • omeprazole (PRILOSEC) 40 mg, 2 times daily   • predniSONE (DELTASONE) 10 mg   • rosuvastatin (CRESTOR) 20 mg, oral, Daily      Allergies:  Patient has no known allergies.    Review of Systems:   A comprehensive 10-point review of systems was obtained including constitutional, neurological, HEENT, pulmonary, cardiovascular, genito-urinary, and other pertinent systems and was negative except as noted in the HPI.     Objective  Physical Exam:  Last Recorded Vitals: Height 1.778 m (5' 10\"), weight 74.4 kg (164 lb).    On physical exam, the patient is a well-nourished, well-developed patient, in no acute distress, able to communicate without assistance in English language. Head and face is atraumatic and normocephalic. Salivary glands are intact. Facial strength is symmetrical bilaterally.       On ear examination:  Right ear: The patient has an open and patent ear canal with a small ear canal osteoma, not collecting debris and not obstructing the ear canal. The tympanic membrane is intact.  AC>BC  Left ear: The patient has an open and patent ear canal. The tympanic membrane is intact.  AC>BC  The [place] is midline    On vestibular exam, the patient has no spontaneous nystagmus, no headshake nystagmus, no head-thrust nystagmus, and no nystagmus on hyperventilation or Valsalva maneuvers. [place]-Hallpike maneuver is negative bilaterally.       On neuro exam, the patient is alert and oriented x3, cranial nerves are grossly " "intact, cerebellar exam is normal.      The rest of the exam, including anterior rhinoscopy, oropharyngeal exam, neck exam, and cardiovascular exam, were normal including no palpable lymphadenopathies, thyroid in the midline position, normal pulses, and normal chest excursion.       Reviewed Results:  Audiology Testing:   I personally reviewed the audiogram from 12/2024 that showed:   Right ear: Normal downsloping to a moderately severe sensorineural hearing loss. Discrimination: 90%   Left ear: Normal downsloping to a moderately severe sensorineural hearing loss. Discrimination: 100%      Imaging:  None     Procedure:  None    Assessment/Plan    1. Mass of right ear canal    2. Osteoma of ear canal    3. Sensorineural hearing loss (SNHL) of both ears        In summary, Mateusz Hastings \"Keegan\" is a 79 y.o. male with baseline bilateral sensorineural hearing loss in the moderately severe range, with good speech understanding.  Additionally he has a right ear canal osteoma that is not causing problems, and is not obstructing the EAC.    For the osteoma there is no need for treatment, as it is currently asymptomatic.    For his baseline bilateral sensory hearing loss he is a very good candidate for bilateral hearing aids.  I provided him with an order for hearing aid evaluation.    I will see him back as needed.  He needs to continue checking his hearing every 2 to 3 years to make sure there is no further decline.         ____________________________________________________  Rusty Charles MD  Professor and Chief   Otology/Neurotology/Lateral Skull-Base Surgery   [facility]  "

## 2024-12-13 ENCOUNTER — APPOINTMENT (OUTPATIENT)
Dept: AUDIOLOGY | Facility: CLINIC | Age: 79
End: 2024-12-13
Payer: MEDICARE

## 2025-01-15 NOTE — ASSESSMENT & PLAN NOTE
Chronic Cough secondary to Chronic Bronchitis/Bronchiectasis + Emphysematous Changes.  -Upper Lobe Emphysema noted on prior Chest CT. Diffuse bronchial wall thickening noted on most recent CXRs.   -I reviewed Spirometry report from 2/16/2021, which was overall normal with no significant bronchodilator response.   -I reviewed [facility] pulmonology records.  -Patient has been using Inhaled Fluticasone daily, but has continued cough.   -Patient is in the precontemplative stage of smoking cessation.  -Will inquire if there would be benefit from LAMA maintenance therapy.

## 2025-01-15 NOTE — PROGRESS NOTES
Subjective   Reason for Visit: Mateusz Hastings is an 79 y.o. male here for a Medicare Wellness visit, chronic cough, other issues.        Past Medical, Surgical, and Family History reviewed and updated in chart.    Reviewed all medications by prescribing practitioner or clinical pharmacist (such as prescriptions, OTCs, herbal therapies and supplements) and documented in the medical record.    HPI    Initial visit 4/3/24  Fabricio switching his care over from [facility] to .  He does not have a history of heart problems but was meeting with a cardiologist on a regular basis.  He is interested in meeting with a cardiologist here at .  He continues to smoke cigarettes and is not sure that he is ready to quit.  He would like to stay healthy to spend more time with his granddaughter and that his motivation.  He took Chantix in the past but it caused nightmares.  He does occasionally use nicotine patch which is effective for curbing cravings.  He stays physically active and reports good exercise tolerance.  He denies chest pain or shortness of breath with exertion.  He completed a stress test on 7/12/2021 negative for ischemia.  He has a history of colon polyps and his last colonoscopy was on 1/10/2024.  5-year follow-up was recommended.     Stress test   7/12/21  CONCLUSIONS:    1. SPECT Perfusion Study: Normal.    2. There is no scintigraphic evidence for inducible ischemia.    3. No evidence of scarred myocardium.    4. Left ventricle is normal in size. The left ventricle systolic   function is normal.    5. Right ventricle is normal in size.    6. This is a low risk scan.         INTERVAL HISTORY 7/19/24  On vacation and does not smoke around family - had to come home early to care for mother in law.  She is in her 90's, other family does not help out.  Using the patch more and cutting back overall  Walking for exercise   Tennis elbow from painting and working at home   Active gardening  Taking melatonin for  "sleep  Hasn't slept well since time in    Having occipital headache from stress         INTERVAL HISTORY 11/1/24  Rx medrol and zithromax on 9/28 -for cough.  Limited phlegm production at that time  His symptoms did not improve and went to see pulm - they added doxy and prednisone  CXR showed linear opacities in left lung base  Cough has improved but has not resolved.  Cough worse at night, waking up coughing phlegm, wet cough  Also reporting some night sweats.  The night sweats have improved with use of antibiotics.   Losing weight and down 7 pounds since April    Interval history 1/16/2025.  This is the first time I am meeting the patient.  Patient continues to have cough, particularly at night.  It is productive cough with clear phlegm.  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  He also had a audiology test done.  He believes that his hearing is adequate.        Patient Care Team:  Aleena Ayala MD as PCP - General (Family Medicine)     Review of Systems   Constitutional:  Negative for chills, fever and unexpected weight change.   HENT:  Negative for rhinorrhea.    Eyes:  Negative for pain.   Respiratory:  Positive for cough. Negative for shortness of breath.    Cardiovascular:  Negative for chest pain.   Gastrointestinal:  Negative for abdominal pain.   Genitourinary:  Negative for hematuria.   Skin:  Negative for rash.   Neurological:  Negative for dizziness, syncope and light-headedness.   Psychiatric/Behavioral:  Negative for behavioral problems and suicidal ideas.        Objective   Vitals:  /75   Pulse 66   Ht 1.778 m (5' 10\")   Wt 75.3 kg (166 lb)   SpO2 98%   BMI 23.82 kg/m²       Physical Exam  Vitals reviewed.   Constitutional:       General: He is not in acute distress.  HENT:      Head: Normocephalic.      Right Ear: External ear normal.      Left Ear: External ear normal.      Nose: No rhinorrhea.      Mouth/Throat:      Mouth: Mucous membranes are moist.   Eyes: "      Conjunctiva/sclera: Conjunctivae normal.   Cardiovascular:      Rate and Rhythm: Normal rate and regular rhythm.      Heart sounds: No murmur heard.     No friction rub. No gallop.   Pulmonary:      Effort: No respiratory distress.      Breath sounds: No wheezing, rhonchi or rales.   Abdominal:      General: Bowel sounds are normal. There is no distension.      Palpations: Abdomen is soft.      Tenderness: There is no abdominal tenderness.   Musculoskeletal:      Cervical back: Neck supple.      Right lower leg: No edema.      Left lower leg: No edema.   Skin:     General: Skin is warm and dry.      Capillary Refill: Capillary refill takes less than 2 seconds.      Comments: Diffuse seborrheic keratosis of the trunk.   Neurological:      Mental Status: He is alert.      Gait: Gait normal.         Problem List Items Addressed This Visit       COPD (chronic obstructive pulmonary disease) (Multi)    Current Assessment & Plan     Chronic Cough secondary to Chronic Bronchitis/Bronchiectasis + Emphysematous Changes.  -Upper Lobe Emphysema noted on prior Chest CT. Diffuse bronchial wall thickening noted on most recent CXRs.   -I reviewed Spirometry report from 2/16/2021, which was overall normal with no significant bronchodilator response.   -I reviewed [facility] pulmonology records.  -Patient has been using Inhaled Fluticasone daily, but has continued cough.   -Patient is in the precontemplative stage of smoking cessation.  -Will inquire if there would be benefit from LAMA maintenance therapy.         Insulin resistance    Current Assessment & Plan     Last FBS and A1C normal.  Will repeat with next routine labs.         Hyperlipidemia    Current Assessment & Plan     Last lipids at target.  Will include fasting lipids with next routine blood work.  Continue rosuvastatin at current dose.          Insomnia    Current Assessment & Plan     Discussed the elements of good sleep hygiene.   Emphasized the importance  of establishing regular sleeping hours, developing routine to prepare for sleep and creating a bedroom environment conducive to sleep.  Referred to National Sleep Foundation web site for additional information.          Osteoma of ear canal    Current Assessment & Plan     -Osteoma of right ear.  -I reviewed outside ENT records.  No intervention required at this time.  -Will continue to monitor.         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  -Medication side effects were reviewed with patient, including adverse interaction with nitroglycerin.         Relevant Medications    tadalafil (Cialis) 10 mg tablet    History of basal cell carcinoma (BCC)    Current Assessment & Plan     -I reviewed outside clinical clinic dermatology records.  -Will continue with annual full-body skin checks.          Other Visit Diagnoses       Routine general medical examination at a health care facility    -  Primary    Relevant Orders    CBC and Auto Differential    Encounter for medication monitoring        Relevant Orders    CBC and Auto Differential              Medical decision making during this visit had more complexity inherent to evaluation and management associated with medical care services that serve as the continuing focal point for all needed health care services and/or with medical care services that are part of ongoing care related to a patient's single, serious condition or a complex condition.     I spent 45 minutes in duration for this visit today. This time consisted of chart review, obtaining history, and/or performing the exam as documented above as well as documenting the clinical information for the encounter in the electronic record, discussing treatment options, plans, and/or goals with patient, family, and/or caregiver, refilling medications, updating the electronic record, ordering medicines, lab work, imaging, referrals, and/or  procedures as documented above and communicating with other [facility].       Will follow-up in 6 months and review pulmonology, allergy/immunology reports.

## 2025-01-16 ENCOUNTER — APPOINTMENT (OUTPATIENT)
Dept: PRIMARY CARE | Facility: CLINIC | Age: 80
End: 2025-01-16
Payer: MEDICARE

## 2025-01-16 ENCOUNTER — LAB (OUTPATIENT)
Dept: LAB | Facility: LAB | Age: 80
End: 2025-01-16
Payer: MEDICARE

## 2025-01-16 VITALS
WEIGHT: 166 LBS | HEIGHT: 70 IN | HEART RATE: 66 BPM | OXYGEN SATURATION: 98 % | DIASTOLIC BLOOD PRESSURE: 75 MMHG | SYSTOLIC BLOOD PRESSURE: 129 MMHG | BODY MASS INDEX: 23.77 KG/M2

## 2025-01-16 DIAGNOSIS — N52.31 ERECTILE DYSFUNCTION AFTER RADICAL PROSTATECTOMY: ICD-10-CM

## 2025-01-16 DIAGNOSIS — Z51.81 ENCOUNTER FOR MEDICATION MONITORING: ICD-10-CM

## 2025-01-16 DIAGNOSIS — J44.9 CHRONIC OBSTRUCTIVE PULMONARY DISEASE, UNSPECIFIED COPD TYPE (MULTI): ICD-10-CM

## 2025-01-16 DIAGNOSIS — Z85.828 HISTORY OF BASAL CELL CARCINOMA (BCC): ICD-10-CM

## 2025-01-16 DIAGNOSIS — G47.00 INSOMNIA, UNSPECIFIED TYPE: ICD-10-CM

## 2025-01-16 DIAGNOSIS — E78.2 MIXED HYPERLIPIDEMIA: ICD-10-CM

## 2025-01-16 DIAGNOSIS — E88.819 INSULIN RESISTANCE: ICD-10-CM

## 2025-01-16 DIAGNOSIS — D16.4 OSTEOMA OF EAR CANAL: ICD-10-CM

## 2025-01-16 DIAGNOSIS — Z00.00 ROUTINE GENERAL MEDICAL EXAMINATION AT A HEALTH CARE FACILITY: ICD-10-CM

## 2025-01-16 DIAGNOSIS — Z00.00 ROUTINE GENERAL MEDICAL EXAMINATION AT A HEALTH CARE FACILITY: Primary | ICD-10-CM

## 2025-01-16 LAB
BASOPHILS # BLD AUTO: 0.06 X10*3/UL (ref 0–0.1)
BASOPHILS NFR BLD AUTO: 0.6 %
EOSINOPHIL # BLD AUTO: 0.37 X10*3/UL (ref 0–0.4)
EOSINOPHIL NFR BLD AUTO: 3.7 %
ERYTHROCYTE [DISTWIDTH] IN BLOOD BY AUTOMATED COUNT: 12.7 % (ref 11.5–14.5)
HCT VFR BLD AUTO: 48 % (ref 41–52)
HGB BLD-MCNC: 15.6 G/DL (ref 13.5–17.5)
IMM GRANULOCYTES # BLD AUTO: 0.04 X10*3/UL (ref 0–0.5)
IMM GRANULOCYTES NFR BLD AUTO: 0.4 % (ref 0–0.9)
LYMPHOCYTES # BLD AUTO: 1.41 X10*3/UL (ref 0.8–3)
LYMPHOCYTES NFR BLD AUTO: 14.2 %
MCH RBC QN AUTO: 30.2 PG (ref 26–34)
MCHC RBC AUTO-ENTMCNC: 32.5 G/DL (ref 32–36)
MCV RBC AUTO: 93 FL (ref 80–100)
MONOCYTES # BLD AUTO: 0.91 X10*3/UL (ref 0.05–0.8)
MONOCYTES NFR BLD AUTO: 9.2 %
NEUTROPHILS # BLD AUTO: 7.14 X10*3/UL (ref 1.6–5.5)
NEUTROPHILS NFR BLD AUTO: 71.9 %
NRBC BLD-RTO: 0 /100 WBCS (ref 0–0)
PLATELET # BLD AUTO: 230 X10*3/UL (ref 150–450)
RBC # BLD AUTO: 5.16 X10*6/UL (ref 4.5–5.9)
WBC # BLD AUTO: 9.9 X10*3/UL (ref 4.4–11.3)

## 2025-01-16 PROCEDURE — 1160F RVW MEDS BY RX/DR IN RCRD: CPT | Performed by: STUDENT IN AN ORGANIZED HEALTH CARE EDUCATION/TRAINING PROGRAM

## 2025-01-16 PROCEDURE — G0439 PPPS, SUBSEQ VISIT: HCPCS | Performed by: STUDENT IN AN ORGANIZED HEALTH CARE EDUCATION/TRAINING PROGRAM

## 2025-01-16 PROCEDURE — 1159F MED LIST DOCD IN RCRD: CPT | Performed by: STUDENT IN AN ORGANIZED HEALTH CARE EDUCATION/TRAINING PROGRAM

## 2025-01-16 PROCEDURE — 85025 COMPLETE CBC W/AUTO DIFF WBC: CPT

## 2025-01-16 PROCEDURE — 1170F FXNL STATUS ASSESSED: CPT | Performed by: STUDENT IN AN ORGANIZED HEALTH CARE EDUCATION/TRAINING PROGRAM

## 2025-01-16 PROCEDURE — 99215 OFFICE O/P EST HI 40 MIN: CPT | Performed by: STUDENT IN AN ORGANIZED HEALTH CARE EDUCATION/TRAINING PROGRAM

## 2025-01-16 PROCEDURE — 1124F ACP DISCUSS-NO DSCNMKR DOCD: CPT | Performed by: STUDENT IN AN ORGANIZED HEALTH CARE EDUCATION/TRAINING PROGRAM

## 2025-01-16 RX ORDER — TADALAFIL 10 MG/1
10 TABLET ORAL DAILY PRN
Qty: 12 TABLET | Refills: 3 | Status: SHIPPED | OUTPATIENT
Start: 2025-01-16 | End: 2026-01-16

## 2025-01-16 ASSESSMENT — ENCOUNTER SYMPTOMS
UNEXPECTED WEIGHT CHANGE: 0
FEVER: 0
LIGHT-HEADEDNESS: 0
OCCASIONAL FEELINGS OF UNSTEADINESS: 0
HEMATURIA: 0
ABDOMINAL PAIN: 0
CHILLS: 0
DEPRESSION: 0
DIZZINESS: 0
EYE PAIN: 0
COUGH: 1
SHORTNESS OF BREATH: 0
LOSS OF SENSATION IN FEET: 0
RHINORRHEA: 0

## 2025-01-16 ASSESSMENT — PATIENT HEALTH QUESTIONNAIRE - PHQ9
SUM OF ALL RESPONSES TO PHQ9 QUESTIONS 1 AND 2: 0
2. FEELING DOWN, DEPRESSED OR HOPELESS: NOT AT ALL
SUM OF ALL RESPONSES TO PHQ9 QUESTIONS 1 AND 2: 0
1. LITTLE INTEREST OR PLEASURE IN DOING THINGS: NOT AT ALL
1. LITTLE INTEREST OR PLEASURE IN DOING THINGS: NOT AT ALL
2. FEELING DOWN, DEPRESSED OR HOPELESS: NOT AT ALL

## 2025-01-16 ASSESSMENT — ACTIVITIES OF DAILY LIVING (ADL)
DOING_HOUSEWORK: INDEPENDENT
GROCERY_SHOPPING: INDEPENDENT
BATHING: INDEPENDENT
TAKING_MEDICATION: INDEPENDENT
MANAGING_FINANCES: INDEPENDENT
DRESSING: INDEPENDENT

## 2025-01-16 NOTE — ASSESSMENT & PLAN NOTE
-Will restart Crestor at 10 mg daily.  -Medication side effects were reviewed with patient, including adverse interaction with nitroglycerin.

## 2025-01-16 NOTE — ASSESSMENT & PLAN NOTE
-Osteoma of right ear.  -I reviewed outside ENT records.  No intervention required at this time.  -Will continue to monitor.

## 2025-01-16 NOTE — ASSESSMENT & PLAN NOTE
-I reviewed outside clinical clinic dermatology records.  -Will continue with annual full-body skin checks.

## 2025-01-17 ENCOUNTER — APPOINTMENT (OUTPATIENT)
Dept: PRIMARY CARE | Facility: CLINIC | Age: 80
End: 2025-01-17
Payer: MEDICARE

## 2025-02-27 ENCOUNTER — APPOINTMENT (OUTPATIENT)
Dept: ALLERGY | Facility: CLINIC | Age: 80
End: 2025-02-27
Payer: MEDICARE

## 2025-02-27 VITALS
DIASTOLIC BLOOD PRESSURE: 68 MMHG | WEIGHT: 165 LBS | RESPIRATION RATE: 17 BRPM | HEIGHT: 70 IN | TEMPERATURE: 97.9 F | OXYGEN SATURATION: 97 % | HEART RATE: 73 BPM | BODY MASS INDEX: 23.62 KG/M2 | SYSTOLIC BLOOD PRESSURE: 122 MMHG

## 2025-02-27 DIAGNOSIS — R05.9 COUGH, UNSPECIFIED TYPE: Primary | ICD-10-CM

## 2025-02-27 DIAGNOSIS — B37.81 ESOPHAGEAL CANDIDIASIS (MULTI): ICD-10-CM

## 2025-02-27 DIAGNOSIS — J41.0 SIMPLE CHRONIC BRONCHITIS (MULTI): ICD-10-CM

## 2025-02-27 DIAGNOSIS — D80.1 HYPOGAMMAGLOBULINEMIA (MULTI): ICD-10-CM

## 2025-02-27 PROCEDURE — 99204 OFFICE O/P NEW MOD 45 MIN: CPT | Performed by: ALLERGY & IMMUNOLOGY

## 2025-02-27 RX ORDER — BUDESONIDE AND FORMOTEROL FUMARATE DIHYDRATE 160; 4.5 UG/1; UG/1
AEROSOL RESPIRATORY (INHALATION)
COMMUNITY

## 2025-02-27 RX ORDER — PANTOPRAZOLE SODIUM 40 MG/1
40 TABLET, DELAYED RELEASE ORAL 2 TIMES DAILY
COMMUNITY
Start: 2025-02-24 | End: 2025-05-25

## 2025-02-27 RX ORDER — NYSTATIN 100000 [USP'U]/ML
4 SUSPENSION ORAL 4 TIMES DAILY
COMMUNITY
Start: 2025-02-24 | End: 2025-03-03

## 2025-02-27 NOTE — PROGRESS NOTES
"Patient ID: Mateusz Hastings \"Per" is a 79 y.o. male.     Chief Complaint: NPV referred by Dr. Zamora  History Of Present Illness  Mateusz Hastings \"Per" is a 79 y.o. male with PMx low IGG presenting for consultation.     Cough for 3 months, so primary doctor checked IGG and it was low, so referred.    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 Previously stopped for 20 yrs.    1/2 ppd.  Uses patch off and on    History of hiatal hernia-had nissen/fundo plication, s/p open repair > 40 yr ago.  When checked via endoscopy, fungus was found.  Protonix 40 mg BID  Using Nystatin for a week.    Has stopped Arnuity and now on Symbicort with spacer. Dr. Gibbs (pulmonary medicine) at [place] has prescribed and follows.    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      Review of Systems    Pertinent positives and negatives have been assessed in the HPI. All other systems have been reviewed and are negative except as noted in the HPI.    Allergies  Patient has no known allergies.    Past Medical History  He has a past medical history of Cancer (Multi) (2000), COPD (chronic obstructive pulmonary disease) (Multi), Hyperlipidemia, Insulin resistance, Prostate cancer (Multi), and Smoking.    Family History  Family History   Problem Relation Name Age of Onset    Other (Brain tumor) Mother      Lung cancer Father          complication of surgery    Accidental death Sister Dominga     Stroke Brother Melquiades     Heart failure Daughter      No Known Problems Daughter          Pediatric dentis - [facility]       No history of food allergy or atopic disease in the family.    Surgical History  He has a past surgical history that includes MR angio head wo IV contrast (05/30/2021); MR angio neck wo IV contrast (05/30/2021); Eye surgery (2000); " Prostate surgery (2000); and Nissen fundoplication.    Social/Environmental History  He reports that he has been smoking cigarettes. He has a 5 pack-year smoking history. He has never used smokeless tobacco. He reports that he does not currently use alcohol. He reports that he does not use drugs.        MEDICATIONS  Current Outpatient Medications on File Prior to Visit   Medication Sig Dispense Refill    azelastine (Astelin) 137 mcg (0.1 %) nasal spray Administer 1 spray into each nostril 2 times a day. Use in each nostril as directed 30 mL 12    budesonide-formoteroL (Symbicort) 160-4.5 mcg/actuation inhaler INHALE TWO PUFFS BY MOUTH as instructed TWO TIMES A DAY      cyanocobalamin (Vitamin B-12) 50 mcg tablet Take 1 tablet (50 mcg) by mouth once daily.      fluticasone (Flonase) 50 mcg/actuation nasal spray Administer 1 spray into each nostril once daily. Shake gently. Before first use, prime pump. After use, clean tip and replace cap.      fluticasone furoate (Arnuity Ellipta) 100 mcg/actuation inhaler Inhale 1 puff once daily. Rinse mouth with water after use to reduce aftertaste and incidence of candidiasis. Do not swallow.      multivitamin tablet Take 1 tablet by mouth once daily.      nystatin (Mycostatin) 100,000 unit/mL suspension Take 4 mL (400,000 Units) by mouth 4 times a day.      omeprazole (PriLOSEC) 40 mg DR capsule Take 1 capsule (40 mg) by mouth twice a day.      pantoprazole (ProtoNix) 40 mg EC tablet Take 1 tablet (40 mg) by mouth twice a day.      rosuvastatin (Crestor) 20 mg tablet Take 1 tablet (20 mg) by mouth once daily. 90 tablet 3    tadalafil (Cialis) 10 mg tablet Take 1 tablet (10 mg) by mouth once daily as needed for erectile dysfunction. 12 tablet 3    albuterol 90 mcg/actuation inhaler Inhale 1-2 puffs every 4 hours if needed for shortness of breath. 18 g 3     No current facility-administered medications on file prior to visit.         Physical Exam  Visit Vitals  /68   Pulse  "73   Temp 36.6 °C (97.9 °F) (Temporal)   Resp 17   Ht 1.778 m (5' 10\")   Wt 74.8 kg (165 lb)   SpO2 97%   BMI 23.68 kg/m²   Smoking Status Every Day   BSA 1.92 m²       Wt Readings from Last 1 Encounters:   02/27/25 74.8 kg (165 lb)       Physical Exam    General: Well appearing, no acute distress  Head: Normocephalic, atraumatic, neck supple without lymphadenopathy  Eyes: EOMI, non-injected  Nose: No nasal crease, nares patent, slightly boggy turbinates, minimal discharge  Throat: Normal dentition, no erythema  Heart: Regular rate and rhythm  Lungs: Clear to auscultation bilaterally, effort normal  Abdomen: Soft, non-tender, normal bowel sounds  Extremities: Moves all extremities symmetrically, no edema  Skin: No rashes/lesions  Psych: normal mood and affect    LAB RESULTS:  CBC:  Recent Labs     01/16/25  0946 05/30/21 1910   WBC 9.9 10.4   HGB 15.6 14.3   HCT 48.0 42.9    231   MCV 93 93   EOSABS 0.37 0.43*       CMP:  Recent Labs     11/14/24  0829 04/11/24  0808 05/30/21 1910    140 140   K 4.5 4.5 3.9    105 106   CO2 30 27 27   ANIONGAP 14 13 11   BUN 17 23 18   CREATININE 0.90 1.04 1.13   EGFR 88 74  --      Recent Labs     11/14/24  0829 04/11/24  0808 05/30/21 1910   ALBUMIN 4.0 4.1 4.0   ALKPHOS 70 69 65   ALT 15 11 11   AST 14 13 11   BILITOT 0.6 0.5 0.3     Recent Labs     01/16/25  0946 05/30/21 1910   EOSABS 0.37 0.43*       IMMUNO:   Recent Labs     11/14/24  0829   *      IGM 32*       COAG:   Recent Labs     05/30/21 1910   INR 1.1         HEME/ENDO:  Recent Labs     04/11/24  0808 07/19/23  0838 09/19/22  1527   HGBA1C 5.6 5.6 5.7*         Assessment/Plan   Keegan is a 78 yo current smoker with a history of low TP, slightly decreased IGG level with normal SPEP. He was recently on oral steroids for exacerbation and is on daily meds for COPD. He did have infection recently with cough, but has not had recurrent infections previously. In addition, there has been " irritant from hobby of [employer].  Low IGG may be secondary to inhaled and oral steroids. There is a slightly low IGM as well.  Will recheck additional labs and call results. Will determine any additional follow up plan if needed.    Sana Peralta, DO

## 2025-03-30 LAB
C TETANI TOXOID AB SER-ACNC: NORMAL [IU]/ML
CH50 SERPL-ACNC: >60 U/ML (ref 31–60)
IGA SERPL-MCNC: NORMAL MG/DL
IGG SERPL-MCNC: NORMAL MG/DL
IGM SERPL-MCNC: NORMAL MG/DL
S PN DA SERO 19F IGG SER-MCNC: NORMAL UG/ML
S PNEUM DA 1 IGG SER-MCNC: NORMAL UG/ML
S PNEUM DA 10A IGG SER-MCNC: NORMAL UG/ML
S PNEUM DA 11A IGG SER-MCNC: NORMAL UG/ML
S PNEUM DA 12F IGG SER-MCNC: NORMAL UG/ML
S PNEUM DA 14 IGG SER-MCNC: NORMAL
S PNEUM DA 15B IGG SER-MCNC: NORMAL UG/ML
S PNEUM DA 17F IGG SER-MCNC: NORMAL UG/ML
S PNEUM DA 18C IGG SER-MCNC: NORMAL
S PNEUM DA 19A IGG SER-MCNC: NORMAL UG/ML
S PNEUM DA 2 IGG SER-MCNC: NORMAL UG/ML
S PNEUM DA 20A IGG SER-MCNC: NORMAL UG/ML
S PNEUM DA 22F IGG SER-MCNC: NORMAL UG/ML
S PNEUM DA 23F IGG SER-MCNC: NORMAL UG/ML
S PNEUM DA 3 IGG SER-MCNC: NORMAL UG/ML
S PNEUM DA 33F IGG SER-MCNC: NORMAL UG/ML
S PNEUM DA 4 IGG SER-MCNC: NORMAL UG/ML
S PNEUM DA 5 IGG SER-MCNC: NORMAL UG/ML
S PNEUM DA 6B IGG SER-MCNC: NORMAL UG/ML
S PNEUM DA 7F IGG SER-MCNC: NORMAL UG/ML
S PNEUM DA 8 IGG SER-MCNC: NORMAL UG/ML
S PNEUM DA 9N IGG SER-MCNC: NORMAL UG/ML
S PNEUM DA 9V IGG SER-MCNC: NORMAL UG/ML

## 2025-04-01 LAB
C TETANI TOXOID AB SER-ACNC: 0.94 IU/ML
CH50 SERPL-ACNC: >60 U/ML (ref 31–60)
IGA SERPL-MCNC: 113 MG/DL (ref 70–320)
IGG SERPL-MCNC: 630 MG/DL (ref 600–1540)
IGM SERPL-MCNC: 33 MG/DL (ref 50–300)
S PN DA SERO 19F IGG SER-MCNC: 2.1 UG/ML
S PNEUM DA 1 IGG SER-MCNC: 7.6 UG/ML
S PNEUM DA 10A IGG SER-MCNC: 1.1 UG/ML
S PNEUM DA 11A IGG SER-MCNC: 2.7 UG/ML
S PNEUM DA 12F IGG SER-MCNC: 7.5 UG/ML
S PNEUM DA 14 IGG SER-MCNC: 4.4
S PNEUM DA 15B IGG SER-MCNC: 24.7 UG/ML
S PNEUM DA 17F IGG SER-MCNC: 1.6 UG/ML
S PNEUM DA 18C IGG SER-MCNC: 13.4
S PNEUM DA 19A IGG SER-MCNC: 16.3 UG/ML
S PNEUM DA 2 IGG SER-MCNC: 1.2 UG/ML
S PNEUM DA 20A IGG SER-MCNC: 1.4 UG/ML
S PNEUM DA 22F IGG SER-MCNC: 12.3 UG/ML
S PNEUM DA 23F IGG SER-MCNC: 22.8 UG/ML
S PNEUM DA 3 IGG SER-MCNC: 0.8 UG/ML
S PNEUM DA 33F IGG SER-MCNC: 2.3 UG/ML
S PNEUM DA 4 IGG SER-MCNC: 1.7 UG/ML
S PNEUM DA 5 IGG SER-MCNC: 9.3 UG/ML
S PNEUM DA 6B IGG SER-MCNC: 6.2 UG/ML
S PNEUM DA 7F IGG SER-MCNC: 2.3 UG/ML
S PNEUM DA 8 IGG SER-MCNC: 14.7 UG/ML
S PNEUM DA 9N IGG SER-MCNC: 0.7 UG/ML
S PNEUM DA 9V IGG SER-MCNC: 1.6 UG/ML

## 2025-05-03 DIAGNOSIS — N52.31 ERECTILE DYSFUNCTION AFTER RADICAL PROSTATECTOMY: ICD-10-CM

## 2025-05-04 RX ORDER — TADALAFIL 10 MG/1
TABLET ORAL
Qty: 12 TABLET | Refills: 11 | Status: SHIPPED | OUTPATIENT
Start: 2025-05-04

## 2025-07-06 PROBLEM — H90.3 SENSORINEURAL HEARING LOSS (SNHL) OF BOTH EARS: Status: RESOLVED | Noted: 2024-12-02 | Resolved: 2025-07-06

## 2025-07-06 PROBLEM — D80.1 HYPOGAMMAGLOBULINEMIA (MULTI): Status: ACTIVE | Noted: 2024-07-22

## 2025-07-06 PROBLEM — R09.81 SINUS CONGESTION: Status: RESOLVED | Noted: 2024-11-02 | Resolved: 2025-07-06

## 2025-07-06 PROBLEM — G47.00 INSOMNIA: Status: RESOLVED | Noted: 2024-07-22 | Resolved: 2025-07-06

## 2025-07-06 PROBLEM — Z98.890 HISTORY OF NISSEN FUNDOPLICATION: Status: ACTIVE | Noted: 2025-07-06

## 2025-07-06 ASSESSMENT — ENCOUNTER SYMPTOMS
UNEXPECTED WEIGHT CHANGE: 0
RHINORRHEA: 0
LIGHT-HEADEDNESS: 0
COUGH: 1
ABDOMINAL PAIN: 0
SHORTNESS OF BREATH: 0
DIZZINESS: 0
EYE PAIN: 0
HEMATURIA: 0
FEVER: 0
CHILLS: 0

## 2025-07-07 ENCOUNTER — APPOINTMENT (OUTPATIENT)
Dept: PRIMARY CARE | Facility: CLINIC | Age: 80
End: 2025-07-07
Payer: MEDICARE

## 2025-07-07 VITALS
DIASTOLIC BLOOD PRESSURE: 66 MMHG | HEIGHT: 70 IN | HEART RATE: 63 BPM | WEIGHT: 165.6 LBS | BODY MASS INDEX: 23.71 KG/M2 | SYSTOLIC BLOOD PRESSURE: 138 MMHG | TEMPERATURE: 98 F | OXYGEN SATURATION: 98 %

## 2025-07-07 DIAGNOSIS — J44.9 CHRONIC OBSTRUCTIVE PULMONARY DISEASE, UNSPECIFIED COPD TYPE (MULTI): ICD-10-CM

## 2025-07-07 DIAGNOSIS — Z85.46 HISTORY OF PROSTATE CANCER: ICD-10-CM

## 2025-07-07 DIAGNOSIS — N52.31 ERECTILE DYSFUNCTION AFTER RADICAL PROSTATECTOMY: ICD-10-CM

## 2025-07-07 DIAGNOSIS — E78.2 MIXED HYPERLIPIDEMIA: Primary | ICD-10-CM

## 2025-07-07 DIAGNOSIS — Z85.828 HISTORY OF BASAL CELL CARCINOMA (BCC): ICD-10-CM

## 2025-07-07 DIAGNOSIS — R73.9 HYPERGLYCEMIA: ICD-10-CM

## 2025-07-07 DIAGNOSIS — Z98.890 HISTORY OF NISSEN FUNDOPLICATION: ICD-10-CM

## 2025-07-07 DIAGNOSIS — E88.819 INSULIN RESISTANCE: ICD-10-CM

## 2025-07-07 DIAGNOSIS — D16.4 OSTEOMA OF EAR CANAL: ICD-10-CM

## 2025-07-07 DIAGNOSIS — D80.1 HYPOGAMMAGLOBULINEMIA (MULTI): ICD-10-CM

## 2025-07-07 PROCEDURE — 99214 OFFICE O/P EST MOD 30 MIN: CPT | Performed by: STUDENT IN AN ORGANIZED HEALTH CARE EDUCATION/TRAINING PROGRAM

## 2025-07-07 PROCEDURE — G2211 COMPLEX E/M VISIT ADD ON: HCPCS | Performed by: STUDENT IN AN ORGANIZED HEALTH CARE EDUCATION/TRAINING PROGRAM

## 2025-07-07 PROCEDURE — 1159F MED LIST DOCD IN RCRD: CPT | Performed by: STUDENT IN AN ORGANIZED HEALTH CARE EDUCATION/TRAINING PROGRAM

## 2025-07-07 PROCEDURE — 1160F RVW MEDS BY RX/DR IN RCRD: CPT | Performed by: STUDENT IN AN ORGANIZED HEALTH CARE EDUCATION/TRAINING PROGRAM

## 2025-07-07 ASSESSMENT — ENCOUNTER SYMPTOMS
OCCASIONAL FEELINGS OF UNSTEADINESS: 0
LOSS OF SENSATION IN FEET: 0
DEPRESSION: 0

## 2025-07-07 NOTE — PROGRESS NOTES
Subjective   Reason for Visit: Mateusz Hastings is an 79 y.o. male       Past Medical, Surgical, and Family History reviewed and updated in chart.    Reviewed all medications by prescribing practitioner or clinical pharmacist (such as prescriptions, OTCs, herbal therapies and supplements) and documented in the medical record.    HPI    Initial visit 4/3/24  Fabricio switching his care over from [facility] to .  He does not have a history of heart problems but was meeting with a cardiologist on a regular basis.  He is interested in meeting with a cardiologist here at .  He continues to smoke cigarettes and is not sure that he is ready to quit.  He would like to stay healthy to spend more time with his granddaughter and that his motivation.  He took Chantix in the past but it caused nightmares.  He does occasionally use nicotine patch which is effective for curbing cravings.  He stays physically active and reports good exercise tolerance.  He denies chest pain or shortness of breath with exertion.  He completed a stress test on 7/12/2021 negative for ischemia.  He has a history of colon polyps and his last colonoscopy was on 1/10/2024.  5-year follow-up was recommended.     Stress test   7/12/21  CONCLUSIONS:    1. SPECT Perfusion Study: Normal.    2. There is no scintigraphic evidence for inducible ischemia.    3. No evidence of scarred myocardium.    4. Left ventricle is normal in size. The left ventricle systolic   function is normal.    5. Right ventricle is normal in size.    6. This is a low risk scan.         INTERVAL HISTORY 7/19/24  On vacation and does not smoke around family - had to come home early to care for mother in law.  She is in her 90's, other family does not help out.  Using the patch more and cutting back overall  Walking for exercise   Tennis elbow from painting and working at home   Active gardening  Taking melatonin for sleep  Hasn't slept well since time in    Having occipital  "headache from stress         INTERVAL HISTORY 11/1/24  Rx medrol and zithromax on 9/28 -for cough.  Limited phlegm production at that time  His symptoms did not improve and went to see pulm - they added doxy and prednisone  CXR showed linear opacities in left lung base  Cough has improved but has not resolved.  Cough worse at night, waking up coughing phlegm, wet cough  Also reporting some night sweats.  The night sweats have improved with use of antibiotics.   Losing weight and down 7 pounds since April    Interval history 1/16/2025.  This is the first time I am meeting the patient.  Patient continues to have cough, particularly at night.  It is productive cough with clear phlegm.  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  He also had a audiology test done.  He believes that his hearing is adequate.    Interval History 7/7/2025:  Patient is here for follow-up.         Patient Care Team:  Aleena Ayala MD as PCP - General (Family Medicine)  Fredo Zamora MD as PCP - [facility] Attributed Provider     Review of Systems   Constitutional:  Negative for chills, fever and unexpected weight change.   HENT:  Negative for rhinorrhea.    Eyes:  Negative for pain.   Respiratory:  Positive for cough. Negative for shortness of breath.    Cardiovascular:  Negative for chest pain.   Gastrointestinal:  Negative for abdominal pain.   Genitourinary:  Negative for hematuria.   Skin:  Negative for rash.   Neurological:  Negative for dizziness, syncope and light-headedness.   Psychiatric/Behavioral:  Negative for behavioral problems and suicidal ideas.        Objective   Vitals:  /66 (BP Location: Left arm, Patient Position: Sitting, BP Cuff Size: Adult)   Pulse 63   Temp 36.7 °C (98 °F) (Temporal)   Ht 1.778 m (5' 10\")   Wt 75.1 kg (165 lb 9.6 oz)   SpO2 98%   BMI 23.76 kg/m²       Physical Exam  Vitals reviewed.   Constitutional:       General: He is not in acute distress.  HENT:      Head: " Normocephalic.      Right Ear: External ear normal.      Left Ear: External ear normal.      Nose: No rhinorrhea.      Mouth/Throat:      Mouth: Mucous membranes are moist.   Eyes:      Conjunctiva/sclera: Conjunctivae normal.   Cardiovascular:      Rate and Rhythm: Normal rate and regular rhythm.      Heart sounds: No murmur heard.     No friction rub. No gallop.   Pulmonary:      Effort: No respiratory distress.      Breath sounds: No wheezing, rhonchi or rales.   Abdominal:      General: Bowel sounds are normal. There is no distension.      Palpations: Abdomen is soft.      Tenderness: There is no abdominal tenderness.   Musculoskeletal:      Cervical back: Neck supple.      Right lower leg: No edema.      Left lower leg: No edema.   Skin:     General: Skin is warm and dry.      Capillary Refill: Capillary refill takes less than 2 seconds.      Comments: Diffuse seborrheic keratosis of the trunk.   Neurological:      Mental Status: He is alert.      Gait: Gait normal.         Problem List Items Addressed This Visit          Medium    COPD (chronic obstructive pulmonary disease) (Multi)    Current Assessment & Plan   Chronic Cough secondary to Chronic Bronchitis/Bronchiectasis + Emphysematous Changes.  -Upper Lobe Emphysema noted on prior Chest CT. Diffuse bronchial wall thickening noted on most recent CXRs.   -I reviewed Spirometry report from 2/16/2021, which was overall normal with no significant bronchodilator response.   -I reviewed [facility] pulmonology records.  -Patient has been using Inhaled Fluticasone daily, but has continued cough.   -Patient is in the precontemplative stage of smoking cessation.  -Will inquire if there would be benefit from LAMA maintenance therapy.         Insulin resistance    Current Assessment & Plan   Last FBS and A1C normal.  Will repeat with next routine labs.         Hyperlipidemia - Primary    Current Assessment & Plan   Last lipids at target.  Will include fasting  lipids with next routine blood work.  Continue rosuvastatin at current dose.          Hypogammaglobulinemia (Multi)    Current Assessment & Plan   - I reviewed immunology notes and diagnostic studies.  Levels have been stable.  Slightly low levels of IgG and IgM in the past most likely due to oral/inhaled corticosteroid use.  Will continue to monitor.         Osteoma of ear canal    Current Assessment & Plan   -Osteoma of right ear.  -I reviewed outside ENT records.  No intervention required at this time.  -Will continue to monitor.         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  -Medication side effects were reviewed with patient, including adverse interaction with nitroglycerin.         History of basal cell carcinoma (BCC)    Current Assessment & Plan   -I reviewed outside clinical clinic dermatology records.  -Will continue with annual full-body skin checks.         History of Nissen fundoplication    History of prostate cancer    Current Assessment & Plan   -Prost ectomy over 20 years ago. PSA have always been negative after that.           Other Visit Diagnoses         Hyperglycemia        Relevant Orders    Hemoglobin A1c              Medical decision making during this visit had more complexity inherent to evaluation and management associated with medical care services that serve as the continuing focal point for all needed health care services and/or with medical care services that are part of ongoing care related to a patient's single, serious condition or a complex condition.

## 2025-07-07 NOTE — ASSESSMENT & PLAN NOTE
Chronic Cough secondary to Chronic Bronchitis/Bronchiectasis + Emphysematous Changes.  -Upper Lobe Emphysema noted on prior Chest CT. Diffuse bronchial wall thickening noted on most recent CXRs.   -I reviewed Spirometry report from 2/16/2021, which was overall normal with no significant bronchodilator response.   -I reviewed [facility] pulmonology records.  -Patient has been using Inhaled Fluticasone daily, but has continued cough.   -Patient is in the precontemplative stage of smoking cessation.  -Will inquire if there would be benefit from LAMA maintenance therapy.

## 2025-07-07 NOTE — ASSESSMENT & PLAN NOTE
- I reviewed immunology notes and diagnostic studies.  Levels have been stable.  Slightly low levels of IgG and IgM in the past most likely due to oral/inhaled corticosteroid use.  Will continue to monitor.

## 2025-07-17 ENCOUNTER — APPOINTMENT (OUTPATIENT)
Dept: PRIMARY CARE | Facility: CLINIC | Age: 80
End: 2025-07-17
Payer: MEDICARE

## 2025-10-06 ENCOUNTER — APPOINTMENT (OUTPATIENT)
Dept: ALLERGY | Facility: CLINIC | Age: 80
End: 2025-10-06
Payer: MEDICARE

## 2026-01-20 ENCOUNTER — APPOINTMENT (OUTPATIENT)
Dept: PRIMARY CARE | Facility: CLINIC | Age: 81
End: 2026-01-20
Payer: MEDICARE